# Patient Record
Sex: FEMALE | Race: BLACK OR AFRICAN AMERICAN | NOT HISPANIC OR LATINO
[De-identification: names, ages, dates, MRNs, and addresses within clinical notes are randomized per-mention and may not be internally consistent; named-entity substitution may affect disease eponyms.]

---

## 2017-04-28 PROBLEM — Z00.00 ENCOUNTER FOR PREVENTIVE HEALTH EXAMINATION: Status: ACTIVE | Noted: 2017-04-28

## 2017-05-04 ENCOUNTER — APPOINTMENT (OUTPATIENT)
Dept: ULTRASOUND IMAGING | Facility: CLINIC | Age: 37
End: 2017-05-04

## 2017-05-04 ENCOUNTER — APPOINTMENT (OUTPATIENT)
Dept: MAMMOGRAPHY | Facility: CLINIC | Age: 37
End: 2017-05-04

## 2018-04-07 ENCOUNTER — EMERGENCY (EMERGENCY)
Facility: HOSPITAL | Age: 38
LOS: 1 days | Discharge: HOME | End: 2018-04-07
Attending: EMERGENCY MEDICINE | Admitting: EMERGENCY MEDICINE
Payer: MEDICAID

## 2018-04-07 VITALS
RESPIRATION RATE: 20 BRPM | DIASTOLIC BLOOD PRESSURE: 98 MMHG | TEMPERATURE: 98 F | HEART RATE: 75 BPM | OXYGEN SATURATION: 100 % | SYSTOLIC BLOOD PRESSURE: 148 MMHG

## 2018-04-07 VITALS
HEART RATE: 74 BPM | TEMPERATURE: 98 F | DIASTOLIC BLOOD PRESSURE: 84 MMHG | SYSTOLIC BLOOD PRESSURE: 132 MMHG | OXYGEN SATURATION: 99 % | RESPIRATION RATE: 16 BRPM

## 2018-04-07 PROCEDURE — 99283 EMERGENCY DEPT VISIT LOW MDM: CPT | Mod: 25

## 2018-04-07 PROCEDURE — 99283 EMERGENCY DEPT VISIT LOW MDM: CPT

## 2018-04-07 RX ORDER — DIPHENHYDRAMINE HCL 50 MG
50 CAPSULE ORAL ONCE
Qty: 0 | Refills: 0 | Status: COMPLETED | OUTPATIENT
Start: 2018-04-07 | End: 2018-04-07

## 2018-04-07 RX ADMIN — Medication 50 MILLIGRAM(S): at 04:38

## 2018-04-07 NOTE — ED PROVIDER NOTE - CARE PLAN
Principal Discharge DX:	Allergic reaction  Assessment and plan of treatment:	You were seen in the Emergency Department for itching. Your examination and lab tests were reassuring.  You may have had an allergic reaction. Take benadryl as needed for itching. Stay aware from the possible allergens as discussed. Please return to the Emergency Department if you have any new concerning symptoms such as severe pain, shortness of breath or any other concerning symptoms.

## 2018-04-07 NOTE — ED PROVIDER NOTE - SKIN, MLM
Skin normal color for race, warm, dry and intact. No evidence of rash. mild erythema to b/l hands and chest

## 2018-04-07 NOTE — ED ADULT NURSE NOTE - CHPI ED SYMPTOMS NEG
no vomiting/no swelling of face, tongue/no throat itching/no nausea/no rash/no wheezing/no difficulty breathing

## 2018-04-07 NOTE — ED PROVIDER NOTE - CONSTITUTIONAL, MLM
normal... Well appearing, well nourished, awake, alert, oriented to person, place, time/situation and in no apparent distress. No, Declined

## 2018-04-07 NOTE — ED PROVIDER NOTE - OBJECTIVE STATEMENT
38yo female p/w generalized pruritis. Pt. reports pruritis in hands, face, chest. Pt. reports intermittent stomach cramps and 2 loose stools. Denies fevers, cough, shortness of breath, cp, vomiting. Pt. reports nausea as well. Did not take any medications. Pt. reports new toothpaste tonight. Pt. had nachos at movie theater last night.

## 2018-04-07 NOTE — ED PROVIDER NOTE - PLAN OF CARE
You were seen in the Emergency Department for itching. Your examination and lab tests were reassuring.  You may have had an allergic reaction. Take benadryl as needed for itching. Stay aware from the possible allergens as discussed. Please return to the Emergency Department if you have any new concerning symptoms such as severe pain, shortness of breath or any other concerning symptoms.

## 2018-04-07 NOTE — ED PROVIDER NOTE - ATTENDING CONTRIBUTION TO CARE
36yo female p/w generalized pruritis with unclear etiology noted, given benadryl with improvement sts.  no heent findings, lung cta b/l, vss, unclear casue with no prior allergic rxs.

## 2018-04-07 NOTE — ED ADULT NURSE NOTE - OBJECTIVE STATEMENT
37 year old female patient presents to ED c/o generalized itchiness to hands and chest that awoke her from sleep at about 345. Patient denies new detergents, soaps, foods or medications. States she went to the movies and had nachos, but did not 37 year old female patient presents to ED c/o generalized itchiness to hands and chest that awoke her from sleep at about 345. Patient denies new detergents, soaps, foods or medications. States she went to the movies and had nachos, but did not have any immediate allergic reaction. Patient denies SOB or wheezing, CP, abd pain, n/v/d, fever, chills. Patient states she felt nauseous upon waking up but it passed shortly after. Pt reports itchiness started in chest but has been improving since arrival. Did not take benadryl prior to arrival. No hives or redness noted to hands or chest.

## 2019-01-31 ENCOUNTER — RESULT REVIEW (OUTPATIENT)
Age: 39
End: 2019-01-31

## 2020-04-16 NOTE — ED ADULT TRIAGE NOTE - ESI TRIAGE ACUITY LEVEL, MLM
Scheduled patient for Monday, 4/20    She states if she is better, she will call and/or send mychart note. 4

## 2020-07-22 ENCOUNTER — EMERGENCY (EMERGENCY)
Facility: HOSPITAL | Age: 40
LOS: 1 days | Discharge: HOME | End: 2020-07-22
Attending: EMERGENCY MEDICINE | Admitting: EMERGENCY MEDICINE
Payer: MEDICAID

## 2020-07-22 VITALS
OXYGEN SATURATION: 100 % | SYSTOLIC BLOOD PRESSURE: 175 MMHG | TEMPERATURE: 98 F | RESPIRATION RATE: 26 BRPM | DIASTOLIC BLOOD PRESSURE: 103 MMHG | HEART RATE: 132 BPM

## 2020-07-22 PROCEDURE — 93010 ELECTROCARDIOGRAM REPORT: CPT

## 2020-07-22 PROCEDURE — 99291 CRITICAL CARE FIRST HOUR: CPT | Mod: 25

## 2020-07-22 NOTE — ED ADULT TRIAGE NOTE - CHIEF COMPLAINT QUOTE
pt here for shortness of breath and chest pain. pt appears anxious, hyperventilating, tachycardic to 130. no pmhx pt here for shortness of breath and chest pain x 3 days, acutely worse this evening. denies any fevers or cough. pt appears anxious, hyperventilating, and tachycardic to 130. no pmhx

## 2020-07-22 NOTE — ED ADULT NURSE NOTE - CHIEF COMPLAINT QUOTE
pt here for shortness of breath and chest pain x 3 days, acutely worse this evening. denies any fevers or cough. pt appears anxious, hyperventilating, and tachycardic to 130. no pmhx

## 2020-07-22 NOTE — ED ADULT NURSE NOTE - OBJECTIVE STATEMENT
Pt received to room 20 a/o x 3 c/o SOB, tingling in bilateral lower extremities  and left arm, and chest tightness intermittently for 2 week and progressively got worse today. Pt noted to be anxious and hyperventilating. pt states she has hx of anxiety attacks. pt states they usually got worse when its hot and humid. Pt noted to relax and appear less anxious through out the interview. Respirations even and unlabored. Lung sounds clear with equal chest rise bilaterally. ABD is soft, non tender, non distended with normal active bowel sounds No complaints of chest pain, headache, nausea, dizziness, vomiting  SOB, fever, chills verbalized. Pt on cardiac monitor in sinus tach.

## 2020-07-23 VITALS
HEART RATE: 97 BPM | OXYGEN SATURATION: 100 % | DIASTOLIC BLOOD PRESSURE: 87 MMHG | RESPIRATION RATE: 17 BRPM | TEMPERATURE: 99 F | SYSTOLIC BLOOD PRESSURE: 142 MMHG

## 2020-07-23 DIAGNOSIS — Z98.890 OTHER SPECIFIED POSTPROCEDURAL STATES: Chronic | ICD-10-CM

## 2020-07-23 LAB
ALBUMIN SERPL ELPH-MCNC: 4.6 G/DL — SIGNIFICANT CHANGE UP (ref 3.3–5)
ALP SERPL-CCNC: 72 U/L — SIGNIFICANT CHANGE UP (ref 40–120)
ALT FLD-CCNC: 10 U/L — SIGNIFICANT CHANGE UP (ref 4–33)
ANION GAP SERPL CALC-SCNC: 17 MMO/L — HIGH (ref 7–14)
ANISOCYTOSIS BLD QL: SIGNIFICANT CHANGE UP
APTT BLD: 26.1 SEC — LOW (ref 27–36.3)
AST SERPL-CCNC: 19 U/L — SIGNIFICANT CHANGE UP (ref 4–32)
BASOPHILS # BLD AUTO: 0.06 K/UL — SIGNIFICANT CHANGE UP (ref 0–0.2)
BASOPHILS NFR BLD AUTO: 0.6 % — SIGNIFICANT CHANGE UP (ref 0–2)
BASOPHILS NFR SPEC: 0.9 % — SIGNIFICANT CHANGE UP (ref 0–2)
BILIRUB SERPL-MCNC: 0.3 MG/DL — SIGNIFICANT CHANGE UP (ref 0.2–1.2)
BLASTS # FLD: 0 % — SIGNIFICANT CHANGE UP (ref 0–0)
BLD GP AB SCN SERPL QL: NEGATIVE — SIGNIFICANT CHANGE UP
BUN SERPL-MCNC: 7 MG/DL — SIGNIFICANT CHANGE UP (ref 7–23)
CALCIUM SERPL-MCNC: 9.3 MG/DL — SIGNIFICANT CHANGE UP (ref 8.4–10.5)
CHLORIDE SERPL-SCNC: 102 MMOL/L — SIGNIFICANT CHANGE UP (ref 98–107)
CO2 SERPL-SCNC: 22 MMOL/L — SIGNIFICANT CHANGE UP (ref 22–31)
CREAT SERPL-MCNC: 0.85 MG/DL — SIGNIFICANT CHANGE UP (ref 0.5–1.3)
D DIMER BLD IA.RAPID-MCNC: < 150 NG/ML — SIGNIFICANT CHANGE UP
ELLIPTOCYTES BLD QL SMEAR: SLIGHT — SIGNIFICANT CHANGE UP
EOSINOPHIL # BLD AUTO: 0.12 K/UL — SIGNIFICANT CHANGE UP (ref 0–0.5)
EOSINOPHIL NFR BLD AUTO: 1.2 % — SIGNIFICANT CHANGE UP (ref 0–6)
EOSINOPHIL NFR FLD: 0 % — SIGNIFICANT CHANGE UP (ref 0–6)
GIANT PLATELETS BLD QL SMEAR: PRESENT — SIGNIFICANT CHANGE UP
GLUCOSE SERPL-MCNC: 116 MG/DL — HIGH (ref 70–99)
HCG SERPL-ACNC: < 5 MIU/ML — SIGNIFICANT CHANGE UP
HCT VFR BLD CALC: 27.5 % — LOW (ref 34.5–45)
HGB BLD-MCNC: 8.1 G/DL — LOW (ref 11.5–15.5)
HYPOCHROMIA BLD QL: SLIGHT — SIGNIFICANT CHANGE UP
IMM GRANULOCYTES NFR BLD AUTO: 0.5 % — SIGNIFICANT CHANGE UP (ref 0–1.5)
INR BLD: 0.96 — SIGNIFICANT CHANGE UP (ref 0.88–1.17)
LYMPHOCYTES # BLD AUTO: 2.82 K/UL — SIGNIFICANT CHANGE UP (ref 1–3.3)
LYMPHOCYTES # BLD AUTO: 28.8 % — SIGNIFICANT CHANGE UP (ref 13–44)
LYMPHOCYTES NFR SPEC AUTO: 15.7 % — SIGNIFICANT CHANGE UP (ref 13–44)
MCHC RBC-ENTMCNC: 21 PG — LOW (ref 27–34)
MCHC RBC-ENTMCNC: 29.5 % — LOW (ref 32–36)
MCV RBC AUTO: 71.2 FL — LOW (ref 80–100)
METAMYELOCYTES # FLD: 0 % — SIGNIFICANT CHANGE UP (ref 0–1)
MICROCYTES BLD QL: SIGNIFICANT CHANGE UP
MONOCYTES # BLD AUTO: 0.7 K/UL — SIGNIFICANT CHANGE UP (ref 0–0.9)
MONOCYTES NFR BLD AUTO: 7.1 % — SIGNIFICANT CHANGE UP (ref 2–14)
MONOCYTES NFR BLD: 3.7 % — SIGNIFICANT CHANGE UP (ref 2–9)
MYELOCYTES NFR BLD: 0 % — SIGNIFICANT CHANGE UP (ref 0–0)
NEUTROPHIL AB SER-ACNC: 76.9 % — SIGNIFICANT CHANGE UP (ref 43–77)
NEUTROPHILS # BLD AUTO: 6.05 K/UL — SIGNIFICANT CHANGE UP (ref 1.8–7.4)
NEUTROPHILS NFR BLD AUTO: 61.8 % — SIGNIFICANT CHANGE UP (ref 43–77)
NEUTS BAND # BLD: 0 % — SIGNIFICANT CHANGE UP (ref 0–6)
NRBC # BLD: 1 /100WBC — SIGNIFICANT CHANGE UP
NRBC # FLD: 0 K/UL — SIGNIFICANT CHANGE UP (ref 0–0)
OTHER - HEMATOLOGY %: 0 — SIGNIFICANT CHANGE UP
OVALOCYTES BLD QL SMEAR: SLIGHT — SIGNIFICANT CHANGE UP
PLATELET # BLD AUTO: 423 K/UL — HIGH (ref 150–400)
PLATELET COUNT - ESTIMATE: NORMAL — SIGNIFICANT CHANGE UP
PMV BLD: 9.6 FL — SIGNIFICANT CHANGE UP (ref 7–13)
POIKILOCYTOSIS BLD QL AUTO: SLIGHT — SIGNIFICANT CHANGE UP
POLYCHROMASIA BLD QL SMEAR: SLIGHT — SIGNIFICANT CHANGE UP
POTASSIUM SERPL-MCNC: 3.6 MMOL/L — SIGNIFICANT CHANGE UP (ref 3.5–5.3)
POTASSIUM SERPL-SCNC: 3.6 MMOL/L — SIGNIFICANT CHANGE UP (ref 3.5–5.3)
PROMYELOCYTES # FLD: 0 % — SIGNIFICANT CHANGE UP (ref 0–0)
PROT SERPL-MCNC: 7 G/DL — SIGNIFICANT CHANGE UP (ref 6–8.3)
PROTHROM AB SERPL-ACNC: 10.9 SEC — SIGNIFICANT CHANGE UP (ref 9.8–13.1)
RBC # BLD: 3.86 M/UL — SIGNIFICANT CHANGE UP (ref 3.8–5.2)
RBC # FLD: 18.3 % — HIGH (ref 10.3–14.5)
RH IG SCN BLD-IMP: POSITIVE — SIGNIFICANT CHANGE UP
SARS-COV-2 RNA SPEC QL NAA+PROBE: SIGNIFICANT CHANGE UP
SCHISTOCYTES BLD QL AUTO: SLIGHT — SIGNIFICANT CHANGE UP
SMUDGE CELLS # BLD: PRESENT — SIGNIFICANT CHANGE UP
SODIUM SERPL-SCNC: 141 MMOL/L — SIGNIFICANT CHANGE UP (ref 135–145)
VARIANT LYMPHS # BLD: 2.8 % — SIGNIFICANT CHANGE UP
WBC # BLD: 9.8 K/UL — SIGNIFICANT CHANGE UP (ref 3.8–10.5)
WBC # FLD AUTO: 9.8 K/UL — SIGNIFICANT CHANGE UP (ref 3.8–10.5)

## 2020-07-23 PROCEDURE — 71046 X-RAY EXAM CHEST 2 VIEWS: CPT | Mod: 26

## 2020-07-23 RX ORDER — SODIUM CHLORIDE 9 MG/ML
1000 INJECTION INTRAMUSCULAR; INTRAVENOUS; SUBCUTANEOUS ONCE
Refills: 0 | Status: COMPLETED | OUTPATIENT
Start: 2020-07-23 | End: 2020-07-23

## 2020-07-23 RX ADMIN — SODIUM CHLORIDE 1000 MILLILITER(S): 9 INJECTION INTRAMUSCULAR; INTRAVENOUS; SUBCUTANEOUS at 02:05

## 2020-07-23 NOTE — ED PROVIDER NOTE - PATIENT PORTAL LINK FT
You can access the FollowMyHealth Patient Portal offered by [facility] by registering at the following website: [email or web address]. By joining [employer]’s FollowMyHealth portal, you will also be able to view your health information using other applications (apps) compatible with our system.

## 2020-07-23 NOTE — ED PROVIDER NOTE - NS ED ROS FT
CONST: no fevers, no chills  EYES: no vision changes  ENT: no sore throat  CV: + chest pain, +palpitations, no leg swelling  RESP: + shortness of breath, no cough  ABD: no abdominal pain, no nausea, no vomiting, no diarrhea  MSK: no back pain, no extremity pain  NEURO: no headache, +LUE numbness   HEME: no easy bleeding  SKIN:  no rash

## 2020-07-23 NOTE — ED PROVIDER NOTE - PHYSICAL EXAMINATION
Physical Exam:  Gen: awake, alert, appears anxious   HEENT: PEERL, normal conjunctiva, oral mucosa moist  Lung: CTAB, no respiratory distress, no wheezes/rhonchi/rales B/L, speaking in full sentences  CV: Rapid rate, RR, no murmurs, rubs or gallops  Abd: soft, NT, ND, no guarding, no rigidity  MSK: no visible deformities, ROM normal in UE/LE  Neuro: MS +5/5 in UE and LE BL, gross sensation intact in UE and LE BL  Skin: Warm, well perfused, no rash, no leg swelling  ~Rachel Chapa D.O. -Resident

## 2020-07-23 NOTE — ED PROVIDER NOTE - CLINICAL SUMMARY MEDICAL DECISION MAKING FREE TEXT BOX
39y F w/ PMHx iron deficiency anemia presenting to the ED w/ 2 weeks of progressively worsening keen and episodes of lightheadedness, palpitations and one episode of numbness to LUE. Tachycardic, HTN on arrival to ED, patient satting 100% on RA , non-tachypneic, appears anxious. Pt endorses hx of COVID in March, w/ new tachycardia, sob, chest tightness will obtain d-dimer to r/o PE, ACS w/u although doubt, possible symptomatic anemia. Labs, trop, dimer, coags, type, EKG, CXR, IVF, reassess.

## 2020-07-23 NOTE — ED PROVIDER NOTE - PROGRESS NOTE DETAILS
Eduard ROJAS (PGY-2): On reassessment, patient states she still "feels anxious" remains tachycardic to 110s, IVF hanging, discussed normal lab findings with patient, will continue to monitor. Pt labs are unremarkable. Anemia is mild at Hb 8. Previous 4 per pt requiring Iron tranfusion. Pt now improved with IVF, VS normalized without antihtn meds. Explained findings to pt. she is amenable to discharge to f/u with her previous scheduled visit with her hematologist tomorrow. Return precautions explained and understood.

## 2020-07-23 NOTE — ED PROVIDER NOTE - OBJECTIVE STATEMENT
39y F w/ PMHx iron deficiency anemia presenting to the ED w/ 2 weeks of progressively worsening lopez and episodes of lightheadedness, palpitations and one episode of numbness to LUE. Patient states she is presenting to ED today because this evening she had the "worst episode of dizziness, w/ chest tightness, LUE numbness and sob". She states she has been under "a lot more stress with the passing of my father". Patient states her chest tightness is exacerbated by her LOPEZ. Gets iron transfusions regularly, states her last transfusion was 5 months although she usually gets them more regularly but was unable to 2/2 COVID. Has hx of Hgb of 4, states at the time she did not get blood transfusion, received iron transfusion. Has appointment with Hematology tomorrow for iron transfusion. Endorses heavy periods but denies recent hx of bloody stools. Patient states she has never had similar symptoms with her hx of anemia. Denies recent fever, chills, cough, n/v, abd pain.

## 2020-07-23 NOTE — ED PROVIDER NOTE - ATTENDING CONTRIBUTION TO CARE
HPI: 39y F w/ PMHx iron deficiency anemia presenting to the ED w/ 2 weeks of progressively worsening lopez and episodes of lightheadedness, palpitations and one episode of numbness to LUE without syncope. Patient states she is presenting to ED today because this evening she had the "worst episode of dizziness, w/ chest tightness, LUE numbness and sob". She states she has been under "a lot more stress with the passing of my father". Patient states her chest tightness is exacerbated by her LOPEZ. Gets iron transfusions regularly, states her last transfusion was 5 months although she usually gets them more regularly but was unable to 2/2 COVID. Has hx of Hgb of 4, states at the time she did not get blood transfusion, received iron transfusion. Has appointment with Hematology tomorrow for iron transfusion. Endorses heavy periods but denies recent hx of bloody stools. Patient states she has never had similar symptoms with her hx of anemia. Denies recent fever, chills, cough, n/v, abd pain. LMP currently. pt reports 8ppd and was rx'd BCPs in past but didn't want side effects.   EXAM: Uncomfortable, Tachy and HTN on VS, writhing, not able to sit still, heart tachy without M/R/G/JVD, lungs ctab, abd soft but distended, no pitting edema BLE, uunable to assess cap refill secondary to painted nails, warm ext with equal and palpable pulses x 4. Speaking half sentences.   MDM: pt with iron def anemia that presents with 2 wks worsening SOB and LOPEZ and lightheaded also palpitation. Pt with heavy menstrual periods (8ppd) that pt multiple symptoms associated with most likely anemia with heavy menstraul periods. Pt tachy and HTN, possible stress and anxiety induced. Also consider PE. Will need work up and IVF and meds and reassess.

## 2020-07-23 NOTE — ED PROVIDER NOTE - CARE PLAN
Principal Discharge DX:	Hiatal hernia Principal Discharge DX:	Shortness of breath  Secondary Diagnosis:	Atypical chest pain

## 2020-07-23 NOTE — ED PROVIDER NOTE - NSFOLLOWUPINSTRUCTIONS_ED_ALL_ED_FT
- Lab and imaging results, if performed, were discussed with you along with your discharge diagnosis    -Please call to schedule a follow-up appointment with your primary doctor later today, bring all of your lab results with you    - Return to the ED for any new, worsening, or concerning symptoms to you including worsening chest pain, shortness of breath of dizziness    - Follow-up with your Hematologist tomorrow for your scheduled iron transfusion     - Rest and keep yourself hydrated with fluids

## 2020-07-23 NOTE — ED PROVIDER NOTE - PMH
No pertinent past medical history <<----- Click to add NO pertinent Past Medical History Iron deficiency anemia

## 2020-10-20 ENCOUNTER — TRANSCRIPTION ENCOUNTER (OUTPATIENT)
Age: 40
End: 2020-10-20

## 2020-10-20 ENCOUNTER — INPATIENT (INPATIENT)
Facility: HOSPITAL | Age: 40
LOS: 0 days | Discharge: HOME | DRG: 310 | End: 2020-10-20
Attending: INTERNAL MEDICINE | Admitting: INTERNAL MEDICINE
Payer: MEDICAID

## 2020-10-20 VITALS
TEMPERATURE: 99 F | RESPIRATION RATE: 18 BRPM | HEART RATE: 87 BPM | OXYGEN SATURATION: 100 % | DIASTOLIC BLOOD PRESSURE: 84 MMHG | SYSTOLIC BLOOD PRESSURE: 122 MMHG

## 2020-10-20 VITALS
RESPIRATION RATE: 18 BRPM | DIASTOLIC BLOOD PRESSURE: 88 MMHG | OXYGEN SATURATION: 100 % | WEIGHT: 175.05 LBS | HEIGHT: 63 IN | HEART RATE: 105 BPM | SYSTOLIC BLOOD PRESSURE: 138 MMHG | TEMPERATURE: 98 F

## 2020-10-20 DIAGNOSIS — D50.9 IRON DEFICIENCY ANEMIA, UNSPECIFIED: ICD-10-CM

## 2020-10-20 DIAGNOSIS — R00.0 TACHYCARDIA, UNSPECIFIED: ICD-10-CM

## 2020-10-20 DIAGNOSIS — I44.1 ATRIOVENTRICULAR BLOCK, SECOND DEGREE: ICD-10-CM

## 2020-10-20 DIAGNOSIS — Z86.19 PERSONAL HISTORY OF OTHER INFECTIOUS AND PARASITIC DISEASES: ICD-10-CM

## 2020-10-20 LAB
ALBUMIN SERPL ELPH-MCNC: 4.7 G/DL — SIGNIFICANT CHANGE UP (ref 3.3–5)
ALP SERPL-CCNC: 70 U/L — SIGNIFICANT CHANGE UP (ref 40–120)
ALT FLD-CCNC: 20 U/L — SIGNIFICANT CHANGE UP (ref 10–45)
ANION GAP SERPL CALC-SCNC: 12 MMOL/L — SIGNIFICANT CHANGE UP (ref 5–17)
APPEARANCE UR: CLEAR — SIGNIFICANT CHANGE UP
AST SERPL-CCNC: 26 U/L — SIGNIFICANT CHANGE UP (ref 10–40)
BASOPHILS # BLD AUTO: 0.06 K/UL — SIGNIFICANT CHANGE UP (ref 0–0.2)
BASOPHILS NFR BLD AUTO: 0.8 % — SIGNIFICANT CHANGE UP (ref 0–2)
BILIRUB SERPL-MCNC: 0.3 MG/DL — SIGNIFICANT CHANGE UP (ref 0.2–1.2)
BILIRUB UR-MCNC: NEGATIVE — SIGNIFICANT CHANGE UP
BUN SERPL-MCNC: 9 MG/DL — SIGNIFICANT CHANGE UP (ref 7–23)
CALCIUM SERPL-MCNC: 9.6 MG/DL — SIGNIFICANT CHANGE UP (ref 8.4–10.5)
CHLORIDE SERPL-SCNC: 98 MMOL/L — SIGNIFICANT CHANGE UP (ref 96–108)
CO2 SERPL-SCNC: 25 MMOL/L — SIGNIFICANT CHANGE UP (ref 22–31)
COLOR SPEC: COLORLESS — SIGNIFICANT CHANGE UP
CREAT SERPL-MCNC: 0.8 MG/DL — SIGNIFICANT CHANGE UP (ref 0.5–1.3)
D DIMER BLD IA.RAPID-MCNC: <150 NG/ML DDU — SIGNIFICANT CHANGE UP
DIFF PNL FLD: NEGATIVE — SIGNIFICANT CHANGE UP
EOSINOPHIL # BLD AUTO: 0.21 K/UL — SIGNIFICANT CHANGE UP (ref 0–0.5)
EOSINOPHIL NFR BLD AUTO: 2.7 % — SIGNIFICANT CHANGE UP (ref 0–6)
GLUCOSE SERPL-MCNC: 117 MG/DL — HIGH (ref 70–99)
GLUCOSE UR QL: NEGATIVE — SIGNIFICANT CHANGE UP
HCG SERPL-ACNC: <2 MIU/ML — SIGNIFICANT CHANGE UP
HCT VFR BLD CALC: 33.3 % — LOW (ref 34.5–45)
HGB BLD-MCNC: 10.4 G/DL — LOW (ref 11.5–15.5)
IMM GRANULOCYTES NFR BLD AUTO: 0.3 % — SIGNIFICANT CHANGE UP (ref 0–1.5)
KETONES UR-MCNC: NEGATIVE — SIGNIFICANT CHANGE UP
LEUKOCYTE ESTERASE UR-ACNC: NEGATIVE — SIGNIFICANT CHANGE UP
LYMPHOCYTES # BLD AUTO: 3.81 K/UL — HIGH (ref 1–3.3)
LYMPHOCYTES # BLD AUTO: 49.2 % — HIGH (ref 13–44)
MCHC RBC-ENTMCNC: 25.3 PG — LOW (ref 27–34)
MCHC RBC-ENTMCNC: 31.2 GM/DL — LOW (ref 32–36)
MCV RBC AUTO: 81 FL — SIGNIFICANT CHANGE UP (ref 80–100)
MONOCYTES # BLD AUTO: 0.56 K/UL — SIGNIFICANT CHANGE UP (ref 0–0.9)
MONOCYTES NFR BLD AUTO: 7.2 % — SIGNIFICANT CHANGE UP (ref 2–14)
NEUTROPHILS # BLD AUTO: 3.08 K/UL — SIGNIFICANT CHANGE UP (ref 1.8–7.4)
NEUTROPHILS NFR BLD AUTO: 39.8 % — LOW (ref 43–77)
NITRITE UR-MCNC: NEGATIVE — SIGNIFICANT CHANGE UP
NRBC # BLD: 0 /100 WBCS — SIGNIFICANT CHANGE UP (ref 0–0)
NT-PROBNP SERPL-SCNC: 22 PG/ML — SIGNIFICANT CHANGE UP (ref 0–300)
PH UR: 6.5 — SIGNIFICANT CHANGE UP (ref 5–8)
PLATELET # BLD AUTO: 364 K/UL — SIGNIFICANT CHANGE UP (ref 150–400)
POTASSIUM SERPL-MCNC: 3.2 MMOL/L — LOW (ref 3.5–5.3)
POTASSIUM SERPL-SCNC: 3.2 MMOL/L — LOW (ref 3.5–5.3)
PROT SERPL-MCNC: 7.2 G/DL — SIGNIFICANT CHANGE UP (ref 6–8.3)
PROT UR-MCNC: NEGATIVE — SIGNIFICANT CHANGE UP
RBC # BLD: 4.11 M/UL — SIGNIFICANT CHANGE UP (ref 3.8–5.2)
RBC # FLD: 18.2 % — HIGH (ref 10.3–14.5)
SARS-COV-2 IGG SERPL QL IA: POSITIVE
SARS-COV-2 IGM SERPL IA-ACNC: 2.46 INDEX — HIGH
SARS-COV-2 RNA SPEC QL NAA+PROBE: SIGNIFICANT CHANGE UP
SODIUM SERPL-SCNC: 135 MMOL/L — SIGNIFICANT CHANGE UP (ref 135–145)
SP GR SPEC: 1 — LOW (ref 1.01–1.02)
TROPONIN T, HIGH SENSITIVITY RESULT: <6 NG/L — SIGNIFICANT CHANGE UP (ref 0–51)
UROBILINOGEN FLD QL: NEGATIVE — SIGNIFICANT CHANGE UP
WBC # BLD: 7.74 K/UL — SIGNIFICANT CHANGE UP (ref 3.8–10.5)
WBC # FLD AUTO: 7.74 K/UL — SIGNIFICANT CHANGE UP (ref 3.8–10.5)

## 2020-10-20 PROCEDURE — 93010 ELECTROCARDIOGRAM REPORT: CPT

## 2020-10-20 PROCEDURE — 99223 1ST HOSP IP/OBS HIGH 75: CPT | Mod: GC

## 2020-10-20 PROCEDURE — 93306 TTE W/DOPPLER COMPLETE: CPT | Mod: 26

## 2020-10-20 PROCEDURE — 99285 EMERGENCY DEPT VISIT HI MDM: CPT

## 2020-10-20 PROCEDURE — 71045 X-RAY EXAM CHEST 1 VIEW: CPT | Mod: 26

## 2020-10-20 RX ORDER — INFLUENZA VIRUS VACCINE 15; 15; 15; 15 UG/.5ML; UG/.5ML; UG/.5ML; UG/.5ML
0.5 SUSPENSION INTRAMUSCULAR ONCE
Refills: 0 | Status: DISCONTINUED | OUTPATIENT
Start: 2020-10-20 | End: 2020-10-20

## 2020-10-20 RX ORDER — SODIUM CHLORIDE 9 MG/ML
1000 INJECTION INTRAMUSCULAR; INTRAVENOUS; SUBCUTANEOUS ONCE
Refills: 0 | Status: COMPLETED | OUTPATIENT
Start: 2020-10-20 | End: 2020-10-20

## 2020-10-20 RX ORDER — POTASSIUM CHLORIDE 20 MEQ
40 PACKET (EA) ORAL ONCE
Refills: 0 | Status: COMPLETED | OUTPATIENT
Start: 2020-10-20 | End: 2020-10-20

## 2020-10-20 RX ADMIN — SODIUM CHLORIDE 1000 MILLILITER(S): 9 INJECTION INTRAMUSCULAR; INTRAVENOUS; SUBCUTANEOUS at 02:33

## 2020-10-20 RX ADMIN — SODIUM CHLORIDE 1000 MILLILITER(S): 9 INJECTION INTRAMUSCULAR; INTRAVENOUS; SUBCUTANEOUS at 03:58

## 2020-10-20 RX ADMIN — Medication 40 MILLIEQUIVALENT(S): at 02:34

## 2020-10-20 NOTE — ED ADULT TRIAGE NOTE - CHIEF COMPLAINT QUOTE
Patient c/o palpitations and SOB symptoms started slowly on Sunday, but Monday got worse at about 2300 pm.

## 2020-10-20 NOTE — ED ADULT NURSE NOTE - NSIMPLEMENTINTERV_GEN_ALL_ED
Implemented All Universal Safety Interventions:  [place] to call system. Call bell, personal items and telephone within reach. Instruct patient to call for assistance. Room bathroom lighting operational. Non-slip footwear when patient is off stretcher. Physically safe environment: no spills, clutter or unnecessary equipment. Stretcher in lowest position, wheels locked, appropriate side rails in place.

## 2020-10-20 NOTE — ED PROVIDER NOTE - ATTENDING CONTRIBUTION TO CARE
39F, pmh anemia 2/2 heavy menses, no transfusions in past, presents with palpitations x two days. Accompanied by intermittent sensation of lightheadedness and "discomfort" in chest. Denies chest pain, sob, abd pain. Denies hx PE or DVT. Denies calf pain or swelling. Denies n/v/d, cough, congestion. Of note did present to ED for similar sx in July, found to be sinus tach, d/c'd with outpatient f/u.    PE: NAD, NCAT, MMM, Trachea midline, Normal conjunctiva, lungs CTAB, S1/S2 irregular, Normal perfusion, 2+ radial pulses bilat, Abdomen Soft, NTND, No rebound/guarding, No LE edema, No deformity of extremities, No rashes,  No focal motor or sensory deficits.     EKG reveals Mobitz I block. No acute ischemic changes noted. Consider anemia, metabolic disturbance, lower suspicion of ischemia. Unlikely PE, however persistently tachycardic, will check d-dimer. Without features suggestive of aortic pathology. Cardiac monitor. Give IVF. Re-eval. - Hugh Evans MD

## 2020-10-20 NOTE — CONSULT NOTE ADULT - ATTENDING COMMENTS
39 year old woman presents with palpitations, had similar presentation to [facility] ER last month and improved with fluids. Currently observe sinus tachycardia with Wenckebach in 3:2 pattern. Overnight symptoms have subsided and rhythm on telemetry is sinus. Observe she had COVID-19 in March. Denies any activities which would likely expose her to Lyme disease. Need cardiac echo doppler and subsequently consider merits of cardiac MRI while continuing to observe on telemetry.

## 2020-10-20 NOTE — ED PROVIDER NOTE - NS ED ROS FT
Constitution: No Fever or chills, No Weight Loss,   HEENT: No cough, No Discharge, No Rhinorrhea, No URI symptoms  Cardio: No Chest pain, (+) Palpitations, No Dyspnea  Resp: No SOB, No Wheezing  GI: No abdominal pain, No Nausea, No Vomiting, No Constipation, No Diarrhea  : No burning upon urination, trouble urinating, no foul odor from urine  MSK: No Back pain, No Numbness, No Tingling, No Weakness  Neuro: No Headache, No changes to Vision, No changes to Hearing, Normal Gait  Skin: No rashes, No Bruising, No Swelling

## 2020-10-20 NOTE — DISCHARGE NOTE NURSING/CASE MANAGEMENT/SOCIAL WORK - PATIENT PORTAL LINK FT
You can access the FollowMyHealth Patient Portal offered by [facility] by registering at the following website: [email or web address]. By joining [employer]’s FollowMyHealth portal, you will also be able to view your health information using other applications (apps) compatible with our system.

## 2020-10-20 NOTE — H&P ADULT - NSHPLABSRESULTS_GEN_ALL_CORE
Vital Signs Last 24 Hrs  T(C): 37 (20 Oct 2020 06:00), Max: 37 (20 Oct 2020 06:00)  T(F): 98.6 (20 Oct 2020 06:00), Max: 98.6 (20 Oct 2020 06:00)  HR: 100 (20 Oct 2020 06:00) (95 - 110)  BP: 126/79 (20 Oct 2020 06:00) (126/79 - 138/88)  BP(mean): --  RR: 18 (20 Oct 2020 06:00) (18 - 20)  SpO2: 100% (20 Oct 2020 06:00) (100% - 100%)      Labs:                          10.4   7.74  )-----------( 364      ( 20 Oct 2020 01:43 )             33.3       10-20    135  |  98  |  9   ----------------------------<  117<H>  3.2<L>   |  25  |  0.80    Ca    9.6      20 Oct 2020 01:43  Mg     1.9     10-20    TPro  7.2  /  Alb  4.7  /  TBili  0.3  /  DBili  x   /  AST  26  /  ALT  20  /  AlkPhos  70  10-20        Urinalysis Basic - ( 20 Oct 2020 02:16 )    Color: Colorless / Appearance: Clear / S.003 / pH: x  Gluc: x / Ketone: Negative  / Bili: Negative / Urobili: Negative   Blood: x / Protein: Negative / Nitrite: Negative   Leuk Esterase: Negative / RBC: x / WBC x   Sq Epi: x / Non Sq Epi: x / Bacteria: x    D-Dimer Assay, Quantitative (10.20.20 @ 02:57)   D-Dimer Assay, Quantitative: <150 ng/mL DDU     Serum Pro-Brain Natriuretic Peptide (10.20.20 @ 01:43)   Serum Pro-Brain Natriuretic Peptide: 22 pg/mL

## 2020-10-20 NOTE — CONSULT NOTE ADULT - ASSESSMENT
39 F with prior history of COVID in march, anemia presents with palpitations for three days with dizziness. She denies chest pain, cardiac markers negative, pro BNP negative, electrolytes WNL. Given the unknown sequela of post COVID tachycardia and cardiomyopathy it is reasonable to evaluation her cardiac function and monitor her rhythm, especially with the variable AV blocks    #Palpitations:  -Check TSH, TTE  -monitor on tele for 24 hours. If work up is negative patient may follow up outpatient with cardiology and per PCP    Xuan Robbins MD  Cardiology Fellow

## 2020-10-20 NOTE — ED PROVIDER NOTE - OBJECTIVE STATEMENT
39 female, Hx: Fe Def Anemia - presents with palpitations for the last 2 days. Pt reports she endorses additional "chest discomfort" when the palpitations come on; denies any chest pain, SOB, dizziness, nausea, vomiting, diaphoresis, numbness/tingling/weakness in peripheral extremities. Denies any prior history of PE/DVT, denies any calf tenderness/swelling/erythema.

## 2020-10-20 NOTE — DISCHARGE NOTE PROVIDER - NSDCCPCAREPLAN_GEN_ALL_CORE_FT
PRINCIPAL DISCHARGE DIAGNOSIS  Diagnosis: Mobitz (type) I (Wenckebach's) atrioventricular block  Assessment and Plan of Treatment: Follow up with Dr. Hardy in 1 week.      SECONDARY DISCHARGE DIAGNOSES  Diagnosis: Tachycardia  Assessment and Plan of Treatment: You may have symptoms such as dizziness, chest pain, or fainting (syncope) that are caused by your fast heart rate.  Take your medication as prescribed.  If episodes of supraventricular tachycardia (SVT) start suddenly and cause symptoms, you can try vagal maneuvers—such as holding your breath and bearing down (Valsalva maneuver), or coughing. These simple maneuvers stimulate the vagus nerve, which can slow conduction of electrical impulses that control your heart rate. Your doctor can teach you how to do vagal maneuvers safely.  Notify your doctor or go to the nearest emergency room if your heart rate doesn't slow and symptoms persist.

## 2020-10-20 NOTE — CONSULT NOTE ADULT - SUBJECTIVE AND OBJECTIVE BOX
Xuan Robbins MD  Cardiology Fellow  [phone]  All Cardiology service information can be found 24/7 on [email or web address], password: [email or web address]    Patient seen and evaluated at bedside    Chief Complaint: palpitations    HPI:  39 F with PMH of anemia due to menorrhagia, COVID in March, presents with palpitations while she was trying to sleep. She began working out earlier today and was able to without any difficulty. She went to bed however she felt her heart racing and was unable. She has been feeling this for the past three days. This has happened one time before 2 months ago. She went to [facility] and was given fluids with improvement of her palpitations and was discharged home. She says she got a "flutter" pain on her left breast three weeks ago but no other chest pain. No SOB, has unlimited ET, no other chest pain, PND, orthopnea or LE edema.     PMHx:   Iron deficiency anemia    No pertinent past medical history        PSHx:   No significant past surgical history    History of hernia repair        Allergies:  No Known Allergies          FAMILY HISTORY: history of MIs and DM in multiple family members      denies any toxic habits    REVIEW OF SYSTEMS:  CONSTITUTIONAL: No weakness, fevers or chills  EYES/ENT: No visual changes;  No dysphagia  NECK: No pain or stiffness  RESPIRATORY: No cough, wheezing, hemoptysis; No shortness of breath  CARDIOVASCULAR: See above  GASTROINTESTINAL: No abdominal or epigastric pain. No nausea, vomiting, or hematemesis; No diarrhea or constipation.  GENITOURINARY: No dysuria, frequency or hematuria  NEUROLOGICAL: No numbness or weakness  SKIN: No itching, burning, rashes, or lesions   All other review of systems is negative unless indicated above.    Physical Exam:  T(F): 97.8 (10-20), Max: 97.8 (10-20)  HR: 104 (10-20) (95 - 110)  BP: 128/89 (10-20) (128/89 - 138/88)  RR: 18 (10-20)  SpO2: 100% (10-20)  GENERAL: No acute distress, well-developed  HEAD:  Atraumatic, Normocephalic  ENT: EOMI, PERRLA, conjunctiva and sclera clear, Neck supple, No JVD, moist mucosa  CHEST/LUNG: Clear to auscultation bilaterally; No wheeze, equal breath sounds bilaterally   BACK: No spinal tenderness  HEART: tachycardic regular rate and rhythm; No murmurs, rubs, or gallops  ABDOMEN: Soft, Nontender, Nondistended; Bowel sounds present  EXTREMITIES:  No clubbing, cyanosis, or edema    Cardiovascular Diagnostic Testing:    ECG: Personally reviewed: Holley type 1 HB at 76 and then 1st degree Hb at 101 on repeat      Labs: Personally reviewed                        10.4   7.74  )-----------( 364      ( 20 Oct 2020 01:43 )             33.3     10-20    135  |  98  |  9   ----------------------------<  117<H>  3.2<L>   |  25  |  0.80    Ca    9.6      20 Oct 2020 01:43  Mg     1.9     10-20    TPro  7.2  /  Alb  4.7  /  TBili  0.3  /  DBili  x   /  AST  26  /  ALT  20  /  AlkPhos  70  10-20        CARDIAC MARKERS ( 20 Oct 2020 01:43 )  <6 ng/L / x     / x     / x     / x     / x          Serum Pro-Brain Natriuretic Peptide: 22 pg/mL (10-20 @ 01:43)

## 2020-10-20 NOTE — ED PROVIDER NOTE - PROGRESS NOTE DETAILS
Pt with persistent lightheadedness/palpitations and continued AV block with episodes of tachycardia to low 100s. Cardiology consulted, will appreciate their recs. - Hugh Evans MD Resident [place]: spoke with pt's PMD (Mikael Hardy -[phone]) - advised him of Cardiology recommendations for TTE inpatient, 2/2 to concern for POSTCovid -related Cardiomyopathy. Persistently Tachycardia s/p 2L IVF. Anemia is baseline. Orthostatics neg. D-Dimer < 150.

## 2020-10-20 NOTE — DISCHARGE NOTE PROVIDER - HOSPITAL COURSE
Patient presented with intermittent palpitations over the past few days has been getting progressively worse patient had palpitations in the past and went to the ER was given IV fluids and got better.  No lightheadedness, no dizziness, no chest pain or shortness of breath.  Patient was diagnosed with coated 19 earlier in the year symptoms gradually resolved.  Patient was seen by cardiology in the emergency room who wanted patient to have an echocardiogram prior to discharge.  Initial cardiac workup in the ER was negative with respect to cardiac enzymes and d-dimer.  Patient was unable to be kept in observation unit as there were no beds so patient was admitted for further management and evaluation.  Since in the hospital.  Patient has not had significant arrhythmias but EKG was found to have first-degree AV block and Mobitz type I AV block on EKG per cardiology.   Problem: Mobitz (type) I (Wenckebach's) atrioventricular block.  Plan: Stable rhythm.  No further evaluation or treatment needed.      Problem/Plan - 2:  ·  Problem: Tachycardia.  Plan: Suspect due to anemiaHeart rate is now well controlled.      Problem/Plan - 3:  ·  Problem: Iron deficiency anemia.  Plan: Followup with hematologist for iron infusions.      Problem/Plan - 4:  ·  Problem: History of 2019 novel coronavirus disease (COVID-19).  Plan: echocardiogram to evaluate for COVID-19 related cardiomyopathy. WNL  D/C to home.

## 2020-10-20 NOTE — H&P ADULT - HISTORY OF PRESENT ILLNESS
Patient presented with intermittent palpitations over the past few days has been getting progressively worse patient had palpitations in the past and went to the ER was given IV fluids and got better.  No lightheadedness, no dizziness, no chest pain or shortness of breath.  Patient was diagnosed with coated 19 earlier in the year symptoms gradually resolved.  Patient was seen by cardiology in the emergency room who wanted patient to have an echocardiogram prior to discharge.  Initial cardiac workup in the ER was negative with respect to cardiac enzymes and d-dimer.  Patient was unable to be kept in observation unit as there were no beds so patient was admitted for further management and evaluation.  Since in the hospital.  Patient has not had significant arrhythmias but EKG was found to have first-degree AV block and Mobitz type I AV block on EKG per cardiology.  However, EKGs are not available in the chart and not available in sunrise at that time that I saw the patient

## 2020-10-20 NOTE — DISCHARGE NOTE PROVIDER - CARE PROVIDER_API CALL
Lex Hardy  INTERNAL MEDICINE  [address], [address]  [place] [address]  Phone: [phone]  Fax: [phone]  Follow Up Time:

## 2020-10-20 NOTE — ED PROVIDER NOTE - PHYSICAL EXAMINATION
GEN - NAD; well appearing; A+Ox3   HEAD - NC/AT, No visible Ecchymosis, No Abrasions, No Lacerations/Skin Tears     EYES - EOMI, no conjunctival pallor, no scleral icterus  ENT -   mucous membranes  moist , no discharge      PULM - CTA B/L,  symmetric breath sounds  COR -  Tachy, S1 S2, no murmurs  ABD - NT/ND, soft, no guarding, no rebound, no masses    BACK - no CVA tenderness  EXTREMS - 2+ Pulses B/L UE and LE; 0+ edema, no gross deformity, warm and well perfused, no calf tenderness/swelling/erythema

## 2020-10-20 NOTE — ED PROVIDER NOTE - CLINICAL SUMMARY MEDICAL DECISION MAKING FREE TEXT BOX
39 female, Hx: Fe Def Anemia - presents with palpitations for the last 2 days. Exam, presentation, and history concerning for palpitations - likely in the setting of anemia vs. orthostatics vs. dysrhythmia - minimal concern for ACS (minimal risk factors, no CP), Dissection, Structural heart Disease. Plan: CBC, CMP, D-Dimer, Trop, CXR, EKG (new onset Mobitz 1, No BETH or Depressions).

## 2020-10-20 NOTE — ED PROVIDER NOTE - CARE PLAN
Principal Discharge DX:	Mobitz (type) I (Wenckebach's) atrioventricular block  Secondary Diagnosis:	Tachycardia

## 2020-10-20 NOTE — ED ADULT NURSE NOTE - OBJECTIVE STATEMENT
Pt is a 39y Female c/o palpitations. Pt PMHx Iron deficiency anemia. Pt states that for the past 3 days she has been having palpitations. Pt states she had a similar episode to this 2 moths ago and was seen at [facility] and d/c to follow up with PCP. Pt has not seen a cardiologist. Pt states she has been having worsening palpitations this evening. Pt is tachycardic and has Mobitz 1 Heart block. Pt is resting in bed anxious appearing. Pt prefers to go by Shannon and uses She/Her/Hers pronouns. Pt resting comfortably in bed with RN and MD at bedside. Pt educated on call bell use and call bell placed at bedside. Pt safety maintained.

## 2020-10-21 LAB
CULTURE RESULTS: SIGNIFICANT CHANGE UP
SPECIMEN SOURCE: SIGNIFICANT CHANGE UP

## 2020-10-22 ENCOUNTER — RESULT REVIEW (OUTPATIENT)
Age: 40
End: 2020-10-22

## 2020-10-29 PROCEDURE — 80053 COMPREHEN METABOLIC PANEL: CPT

## 2020-10-29 PROCEDURE — 84702 CHORIONIC GONADOTROPIN TEST: CPT

## 2020-10-29 PROCEDURE — 83880 ASSAY OF NATRIURETIC PEPTIDE: CPT

## 2020-10-29 PROCEDURE — 84484 ASSAY OF TROPONIN QUANT: CPT

## 2020-10-29 PROCEDURE — 93005 ELECTROCARDIOGRAM TRACING: CPT

## 2020-10-29 PROCEDURE — 81003 URINALYSIS AUTO W/O SCOPE: CPT

## 2020-10-29 PROCEDURE — 85379 FIBRIN DEGRADATION QUANT: CPT

## 2020-10-29 PROCEDURE — 85025 COMPLETE CBC W/AUTO DIFF WBC: CPT

## 2020-10-29 PROCEDURE — 83735 ASSAY OF MAGNESIUM: CPT

## 2020-10-29 PROCEDURE — 87086 URINE CULTURE/COLONY COUNT: CPT

## 2020-10-29 PROCEDURE — 99284 EMERGENCY DEPT VISIT MOD MDM: CPT | Mod: 25

## 2020-10-29 PROCEDURE — 71045 X-RAY EXAM CHEST 1 VIEW: CPT

## 2020-10-29 PROCEDURE — C8929: CPT

## 2020-10-29 PROCEDURE — U0003: CPT

## 2020-10-29 PROCEDURE — 86769 SARS-COV-2 COVID-19 ANTIBODY: CPT

## 2020-12-08 ENCOUNTER — EMERGENCY (EMERGENCY)
Facility: HOSPITAL | Age: 40
LOS: 1 days | Discharge: HOME | End: 2020-12-08
Attending: EMERGENCY MEDICINE
Payer: MEDICAID

## 2020-12-08 VITALS
HEART RATE: 115 BPM | WEIGHT: 149.91 LBS | SYSTOLIC BLOOD PRESSURE: 166 MMHG | HEIGHT: 62 IN | TEMPERATURE: 98 F | OXYGEN SATURATION: 100 % | RESPIRATION RATE: 18 BRPM | DIASTOLIC BLOOD PRESSURE: 105 MMHG

## 2020-12-08 LAB
ALBUMIN SERPL ELPH-MCNC: 4.6 G/DL — SIGNIFICANT CHANGE UP (ref 3.3–5)
ALP SERPL-CCNC: 76 U/L — SIGNIFICANT CHANGE UP (ref 40–120)
ALT FLD-CCNC: 17 U/L — SIGNIFICANT CHANGE UP (ref 10–45)
ANION GAP SERPL CALC-SCNC: 13 MMOL/L — SIGNIFICANT CHANGE UP (ref 5–17)
AST SERPL-CCNC: 24 U/L — SIGNIFICANT CHANGE UP (ref 10–40)
BASOPHILS # BLD AUTO: 0.07 K/UL — SIGNIFICANT CHANGE UP (ref 0–0.2)
BASOPHILS NFR BLD AUTO: 0.9 % — SIGNIFICANT CHANGE UP (ref 0–2)
BILIRUB SERPL-MCNC: 0.3 MG/DL — SIGNIFICANT CHANGE UP (ref 0.2–1.2)
BUN SERPL-MCNC: 6 MG/DL — LOW (ref 7–23)
CALCIUM SERPL-MCNC: 9.9 MG/DL — SIGNIFICANT CHANGE UP (ref 8.4–10.5)
CHLORIDE SERPL-SCNC: 103 MMOL/L — SIGNIFICANT CHANGE UP (ref 96–108)
CO2 SERPL-SCNC: 25 MMOL/L — SIGNIFICANT CHANGE UP (ref 22–31)
CREAT SERPL-MCNC: 0.91 MG/DL — SIGNIFICANT CHANGE UP (ref 0.5–1.3)
EOSINOPHIL # BLD AUTO: 0.05 K/UL — SIGNIFICANT CHANGE UP (ref 0–0.5)
EOSINOPHIL NFR BLD AUTO: 0.6 % — SIGNIFICANT CHANGE UP (ref 0–6)
GAS PNL BLDV: SIGNIFICANT CHANGE UP
GLUCOSE SERPL-MCNC: 111 MG/DL — HIGH (ref 70–99)
HCT VFR BLD CALC: 31.1 % — LOW (ref 34.5–45)
HGB BLD-MCNC: 9.3 G/DL — LOW (ref 11.5–15.5)
IMM GRANULOCYTES NFR BLD AUTO: 0.6 % — SIGNIFICANT CHANGE UP (ref 0–1.5)
LYMPHOCYTES # BLD AUTO: 2.04 K/UL — SIGNIFICANT CHANGE UP (ref 1–3.3)
LYMPHOCYTES # BLD AUTO: 26.5 % — SIGNIFICANT CHANGE UP (ref 13–44)
MAGNESIUM SERPL-MCNC: 2 MG/DL — SIGNIFICANT CHANGE UP (ref 1.6–2.6)
MCHC RBC-ENTMCNC: 23.3 PG — LOW (ref 27–34)
MCHC RBC-ENTMCNC: 29.9 GM/DL — LOW (ref 32–36)
MCV RBC AUTO: 77.8 FL — LOW (ref 80–100)
MONOCYTES # BLD AUTO: 0.47 K/UL — SIGNIFICANT CHANGE UP (ref 0–0.9)
MONOCYTES NFR BLD AUTO: 6.1 % — SIGNIFICANT CHANGE UP (ref 2–14)
NEUTROPHILS # BLD AUTO: 5.03 K/UL — SIGNIFICANT CHANGE UP (ref 1.8–7.4)
NEUTROPHILS NFR BLD AUTO: 65.3 % — SIGNIFICANT CHANGE UP (ref 43–77)
NRBC # BLD: 0 /100 WBCS — SIGNIFICANT CHANGE UP (ref 0–0)
PLATELET # BLD AUTO: 381 K/UL — SIGNIFICANT CHANGE UP (ref 150–400)
POTASSIUM SERPL-MCNC: 3.5 MMOL/L — SIGNIFICANT CHANGE UP (ref 3.5–5.3)
POTASSIUM SERPL-SCNC: 3.5 MMOL/L — SIGNIFICANT CHANGE UP (ref 3.5–5.3)
PROT SERPL-MCNC: 7.5 G/DL — SIGNIFICANT CHANGE UP (ref 6–8.3)
RBC # BLD: 4 M/UL — SIGNIFICANT CHANGE UP (ref 3.8–5.2)
RBC # FLD: 14.7 % — HIGH (ref 10.3–14.5)
SODIUM SERPL-SCNC: 141 MMOL/L — SIGNIFICANT CHANGE UP (ref 135–145)
TROPONIN T, HIGH SENSITIVITY RESULT: <6 NG/L — SIGNIFICANT CHANGE UP (ref 0–51)
WBC # BLD: 7.71 K/UL — SIGNIFICANT CHANGE UP (ref 3.8–10.5)
WBC # FLD AUTO: 7.71 K/UL — SIGNIFICANT CHANGE UP (ref 3.8–10.5)

## 2020-12-08 PROCEDURE — 71046 X-RAY EXAM CHEST 2 VIEWS: CPT | Mod: 26

## 2020-12-08 PROCEDURE — 99285 EMERGENCY DEPT VISIT HI MDM: CPT

## 2020-12-08 RX ORDER — IRON SUCROSE 20 MG/ML
200 INJECTION, SOLUTION INTRAVENOUS ONCE
Refills: 0 | Status: COMPLETED | OUTPATIENT
Start: 2020-12-08 | End: 2020-12-08

## 2020-12-08 RX ORDER — SODIUM CHLORIDE 9 MG/ML
1000 INJECTION INTRAMUSCULAR; INTRAVENOUS; SUBCUTANEOUS ONCE
Refills: 0 | Status: COMPLETED | OUTPATIENT
Start: 2020-12-08 | End: 2020-12-08

## 2020-12-08 RX ADMIN — SODIUM CHLORIDE 1000 MILLILITER(S): 9 INJECTION INTRAMUSCULAR; INTRAVENOUS; SUBCUTANEOUS at 23:00

## 2020-12-08 NOTE — ED PROVIDER NOTE - OBJECTIVE STATEMENT
40y female with PMH of iron deficiency anemia presenting with palpitations. Started this morning, similar to when she had similar symptoms in october. Palpitations are constant. No associated SOB, exertional SOB, leg swelling, chest pain, nausea, vomiting, diaphoresis. Had pain between the shoulder blades yesterday, now resolved. Had normal echo in october after admission for similar symptoms. Menstrual cycle started on 12/4.

## 2020-12-08 NOTE — ED PROVIDER NOTE - PHYSICAL EXAMINATION
Gen: AAOx3, non-toxic  Head: NCAT  HEENT: oral mucosa moist, normal conjunctiva  Lung: CTAB, no respiratory distress, speaking in full sentences  CV: RRR, no murmurs, rubs or gallops, no LE swelling  Abd: soft, NTND, no guarding  MSK: no visible deformities  Neuro: No focal sensory or motor deficits  Skin: Warm, well perfused, no rash  Psych: normal affect.   ~Juan Yariel PGY3

## 2020-12-08 NOTE — ED ADULT NURSE NOTE - OBJECTIVE STATEMENT
Patient is a 40y female presenting to the ED ambulatory from home with c/o palpitations. Patient A&Ox4. Patient is a 40y female presenting to the ED ambulatory from home with c/o palpitations. Patient A&Ox4. Patient reports developing palpitations this morning and noticing a rapid heart rate on her "Apple Watch" with heart rate going up to 130 bpm. Denies any chest pain or SOB associated with the rapid heart rate. Denies dizziness, Patient is a 40y female presenting to the ED ambulatory from home with c/o palpitations. Patient A&Ox4. Patient reports developing palpitations this morning and noticing a rapid heart rate on her "Apple Watch" with heart rate going up to 130 bpm. Denies any chest pain or SOB associated with the rapid heart rate. Denies dizziness, fever/chills, nausea, vomiting, diarrhea, abdominal pain. Denies any recent sick contacts or recent travel. Patient is a 40y female presenting to the ED ambulatory from home with c/o palpitations. Patient A&Ox4. Patient reports developing palpitations this morning and noticing a rapid heart rate on her "Apple Watch" with heart rate going up to 130 bpm. Upon arrival to [facility] ED, patient's heart rate ranging between 120-136 bpm. ECG showed sinus tachycardia, patient placed on cardiac monitor. Lung sounds clear bilaterally. Patient reports having a similar episode in October of this year, was treated at [facility]. Denies any chest pain or SOB associated with the rapid heart rate. Denies dizziness, fever/chills, nausea, vomiting, diarrhea, abdominal pain. Denies any recent sick contacts or recent travel.

## 2020-12-08 NOTE — ED PROVIDER NOTE - NS ED ROS FT
Gen: No fever, No chills  Eyes: No vision changes  ENT: No congestion, sore throat  Resp: No cough. No SOB  Cardiovascular: No chest pain. +palpitations  GI: No abdominal pain, nausea/vomiting, diarrhea, constipation  :  No change in urine output or frequency; no dysuria  MS: No joint. No muscle pain  Skin: No rashes  Neuro: No headache; No numbness or weakness  Remainder negative, except as per the HPI

## 2020-12-09 VITALS
SYSTOLIC BLOOD PRESSURE: 154 MMHG | DIASTOLIC BLOOD PRESSURE: 91 MMHG | OXYGEN SATURATION: 97 % | TEMPERATURE: 98 F | HEART RATE: 92 BPM | RESPIRATION RATE: 18 BRPM

## 2020-12-09 LAB
A1C WITH ESTIMATED AVERAGE GLUCOSE RESULT: 5.8 % — HIGH (ref 4–5.6)
APPEARANCE UR: CLEAR — SIGNIFICANT CHANGE UP
BILIRUB UR-MCNC: NEGATIVE — SIGNIFICANT CHANGE UP
CHOLEST SERPL-MCNC: 151 MG/DL — SIGNIFICANT CHANGE UP
COLOR SPEC: COLORLESS — SIGNIFICANT CHANGE UP
DIFF PNL FLD: ABNORMAL
ESTIMATED AVERAGE GLUCOSE: 120 MG/DL — HIGH (ref 68–114)
GLUCOSE UR QL: NEGATIVE — SIGNIFICANT CHANGE UP
HDLC SERPL-MCNC: 62 MG/DL — SIGNIFICANT CHANGE UP
KETONES UR-MCNC: NEGATIVE — SIGNIFICANT CHANGE UP
LEUKOCYTE ESTERASE UR-ACNC: NEGATIVE — SIGNIFICANT CHANGE UP
LIPID PNL WITH DIRECT LDL SERPL: 73 MG/DL — SIGNIFICANT CHANGE UP
NITRITE UR-MCNC: NEGATIVE — SIGNIFICANT CHANGE UP
NON HDL CHOLESTEROL: 89 MG/DL — SIGNIFICANT CHANGE UP
PH UR: 7 — SIGNIFICANT CHANGE UP (ref 5–8)
PROT UR-MCNC: NEGATIVE — SIGNIFICANT CHANGE UP
SARS-COV-2 RNA SPEC QL NAA+PROBE: SIGNIFICANT CHANGE UP
SP GR SPEC: 1 — LOW (ref 1.01–1.02)
TRIGL SERPL-MCNC: 80 MG/DL — SIGNIFICANT CHANGE UP
TSH SERPL-MCNC: 1.84 UIU/ML — SIGNIFICANT CHANGE UP (ref 0.27–4.2)
UROBILINOGEN FLD QL: NEGATIVE — SIGNIFICANT CHANGE UP

## 2020-12-09 PROCEDURE — 84484 ASSAY OF TROPONIN QUANT: CPT

## 2020-12-09 PROCEDURE — 82803 BLOOD GASES ANY COMBINATION: CPT

## 2020-12-09 PROCEDURE — 93306 TTE W/DOPPLER COMPLETE: CPT

## 2020-12-09 PROCEDURE — 93306 TTE W/DOPPLER COMPLETE: CPT | Mod: 26,59

## 2020-12-09 PROCEDURE — 83036 HEMOGLOBIN GLYCOSYLATED A1C: CPT

## 2020-12-09 PROCEDURE — 84295 ASSAY OF SERUM SODIUM: CPT

## 2020-12-09 PROCEDURE — 93018 CV STRESS TEST I&R ONLY: CPT

## 2020-12-09 PROCEDURE — 85018 HEMOGLOBIN: CPT

## 2020-12-09 PROCEDURE — 80053 COMPREHEN METABOLIC PANEL: CPT

## 2020-12-09 PROCEDURE — 83735 ASSAY OF MAGNESIUM: CPT

## 2020-12-09 PROCEDURE — G0378: CPT

## 2020-12-09 PROCEDURE — 84132 ASSAY OF SERUM POTASSIUM: CPT

## 2020-12-09 PROCEDURE — 99236 HOSP IP/OBS SAME DATE HI 85: CPT

## 2020-12-09 PROCEDURE — 82435 ASSAY OF BLOOD CHLORIDE: CPT

## 2020-12-09 PROCEDURE — 93320 DOPPLER ECHO COMPLETE: CPT

## 2020-12-09 PROCEDURE — 96374 THER/PROPH/DIAG INJ IV PUSH: CPT

## 2020-12-09 PROCEDURE — 84443 ASSAY THYROID STIM HORMONE: CPT

## 2020-12-09 PROCEDURE — 93351 STRESS TTE COMPLETE: CPT

## 2020-12-09 PROCEDURE — 81001 URINALYSIS AUTO W/SCOPE: CPT

## 2020-12-09 PROCEDURE — 80061 LIPID PANEL: CPT

## 2020-12-09 PROCEDURE — 83605 ASSAY OF LACTIC ACID: CPT

## 2020-12-09 PROCEDURE — 99285 EMERGENCY DEPT VISIT HI MDM: CPT | Mod: 25

## 2020-12-09 PROCEDURE — 82330 ASSAY OF CALCIUM: CPT

## 2020-12-09 PROCEDURE — U0003: CPT

## 2020-12-09 PROCEDURE — 93005 ELECTROCARDIOGRAM TRACING: CPT

## 2020-12-09 PROCEDURE — 93017 CV STRESS TEST TRACING ONLY: CPT

## 2020-12-09 PROCEDURE — 85014 HEMATOCRIT: CPT

## 2020-12-09 PROCEDURE — 93325 DOPPLER ECHO COLOR FLOW MAPG: CPT

## 2020-12-09 PROCEDURE — 82947 ASSAY GLUCOSE BLOOD QUANT: CPT

## 2020-12-09 PROCEDURE — 71046 X-RAY EXAM CHEST 2 VIEWS: CPT

## 2020-12-09 PROCEDURE — 93016 CV STRESS TEST SUPVJ ONLY: CPT

## 2020-12-09 PROCEDURE — 85025 COMPLETE CBC W/AUTO DIFF WBC: CPT

## 2020-12-09 PROCEDURE — 93350 STRESS TTE ONLY: CPT | Mod: 26

## 2020-12-09 RX ADMIN — IRON SUCROSE 110 MILLIGRAM(S): 20 INJECTION, SOLUTION INTRAVENOUS at 00:00

## 2020-12-09 NOTE — ED CDU PROVIDER INITIAL DAY NOTE - FAMILY HISTORY
Father  Still living? Unknown  Family history of heart valve abnormality, Age at diagnosis: Age Unknown

## 2020-12-09 NOTE — ED CDU PROVIDER DISPOSITION NOTE - CLINICAL COURSE
40y female with PMH of iron deficiency anemia presenting with palpitations. Started this morning, similar to when she had similar symptoms in october. Palpitations are constant. No associated SOB, exertional SOB, leg swelling, chest pain, nausea, vomiting, diaphoresis. Had pain between the shoulder blades yesterday, now resolved. Had normal echo in october after admission for similar symptoms. Menstrual cycle started on 12/4.  In ED pts HR lowered to low 100s HD stable, labs unrevealing with H/H near baseline. CXR without acute findings. pt received iron transfusion (due for outpt schedule). Went to CDU for tele, stress, echo concern for persist tachycardia of unknown origin.  In CDU, 40y female with PMH of iron deficiency anemia presenting with palpitations. Started this morning, similar to when she had similar symptoms in october. Palpitations are constant. No associated SOB, exertional SOB, leg swelling, chest pain, nausea, vomiting, diaphoresis. Had pain between the shoulder blades yesterday, now resolved. Had normal echo in october after admission for similar symptoms. Menstrual cycle started on 12/4.  In ED pts HR lowered to low 100s HD stable, labs unrevealing with H/H near baseline. CXR without acute findings. pt received iron transfusion (due for outpt schedule). Went to CDU for tele, stress, echo concern for persist tachycardia of unknown origin.  In CDU, patient's symptoms improved.  no events on tele. TTE and stress echo normal.  Patient to follow up with her PMD and cardiology upon discharge.  Strict return precautions provided.

## 2020-12-09 NOTE — ED CDU PROVIDER DISPOSITION NOTE - PATIENT PORTAL LINK FT
You can access the FollowMyHealth Patient Portal offered by [facility] by registering at the following website: [email or web address]. By joining [employer]’s FollowMyHealth portal, you will also be able to view your health information using other applications (apps) compatible with our system.

## 2020-12-09 NOTE — ED CDU PROVIDER INITIAL DAY NOTE - OBJECTIVE STATEMENT
40y female with PMH of iron deficiency anemia presenting with palpitations. Started this morning, similar to when she had similar symptoms in october. Palpitations are constant. No associated SOB, exertional SOB, leg swelling, chest pain, nausea, vomiting, diaphoresis. Had pain between the shoulder blades yesterday, now resolved. Had normal echo in october after admission for similar symptoms. Menstrual cycle started on 12/4.  In ED pts HR lowered to low 100s, labs unrevealing with H/H near baseline. CXR without acute findings. pt received iron transfusion (due for outpt schedule). Went to CDU for tele, stress, echo concern for persist tachycardia of unknown origin. 40y female with PMH of iron deficiency anemia presenting with palpitations. Started this morning, similar to when she had similar symptoms in october. Palpitations are constant. No associated SOB, exertional SOB, leg swelling, chest pain, nausea, vomiting, diaphoresis. Had pain between the shoulder blades yesterday, now resolved. Had normal echo in october after admission for similar symptoms. Menstrual cycle started on 12/4.  In ED pts HR lowered to low 100s HD stable, labs unrevealing with H/H near baseline. CXR without acute findings. pt received iron transfusion (due for outpt schedule). Went to CDU for tele, stress, echo concern for persist tachycardia of unknown origin.

## 2020-12-09 NOTE — ED CDU PROVIDER INITIAL DAY NOTE - PROGRESS NOTE DETAILS
CDU PROGRESS NOTE TERI [place]: pt resting comfortably without complaint. NAD. VSS. denies CP/palpitations. no events on tele, NSR. pending stress and echo. Will continue to monitor. Patient seen at bedside in NAD.  VSS.  Patient resting comfortably without complaints. Palpitations have resolved.  TTE performed, awaiting results.  Awaiting stress.  -Miguel Lynch PA-C Patient seen at bedside in NAD.  VSS.  Patient resting comfortably without complaints. TTE unremarkable. Awaiting stress.  -Miguel Lynch PA-C Stress test equivocal with 0.5mm inferior downsloping st segments.  Contacted stress lab who will expedite stress echo for further evaluation.  -Miguel Lynch PA-C Patient seen at bedside in NAD.  VSS.  Patient resting comfortably without complaints. Stress echo normal.  Will DC home with PMD and cardiology follow up.  Strict return precautions provided.  -Miguel Lynch PA-C pt without complaints.  false + stress test --> negative stress echo.  pt stable for d/c and will fu with PMD and cardiology.

## 2020-12-09 NOTE — ED CDU PROVIDER INITIAL DAY NOTE - PHYSICAL EXAMINATION
Gen: AAOx3, non-toxic  Head: NCAT  HEENT: oral mucosa moist, normal conjunctiva  Lung: CTAB, no respiratory distress, speaking in full sentences  CV: RRR, no murmurs, rubs or gallops, no LE swelling  Abd: soft, NTND, no guarding  MSK: no visible deformities  Neuro: No focal sensory or motor deficits  Skin: Warm, well perfused, no rash  Psych: normal affect.

## 2020-12-09 NOTE — ED ADULT NURSE REASSESSMENT NOTE - NS ED NURSE REASSESS COMMENT FT1
Patient resting comfortably in stretcher. No complaints at this time. Patient to go to CDU for testing in the morning. Patient verbalized understanding of plan of care. Patient walked to restroom. Safety and comfort measures maintained.
Pt received from CLARISSA Flowers. Pt oriented to CDU & plan of care was discussed. Pt denies any chest pain, SOB, dizziness or palpitations. Pt states heart fluttering has stopped and she is feeling better than when she first arrived. Safety & comfort measures maintained. Call bell in reach. Will continue to monitor.

## 2020-12-09 NOTE — ED CDU PROVIDER DISPOSITION NOTE - CARE PROVIDER_API CALL
Ivette Washington (MD)  Cardiovascular Disease; Interventional Cardiology  [address] [place] [address]  Phone: [phone]  Fax: [phone]  Follow Up Time:

## 2020-12-09 NOTE — ED CDU PROVIDER INITIAL DAY NOTE - MEDICAL DECISION MAKING DETAILS
Dr. Hernandez (Attending Physician)  Christian Walker, iron deficiency anemia with palpitations, chest/upper back pain. very low suspicion for dissection or PE. Persistently tachycardic. Also with recent vaginal bleeding but no longer bleeding. Unable to get iron infusion after last visit. Will obs for iron infusion, stress test. Had echo in October and negative d-dimer when she had similar symptoms.

## 2020-12-09 NOTE — ED CDU PROVIDER DISPOSITION NOTE - NSFOLLOWUPINSTRUCTIONS_ED_ALL_ED_FT
Follow up with your PMD and/or cardiologist within 48-72 hours.   Show copies of your reports given to you.    Take all of your other medications as previously prescribed.     Return to the ED for worsening chest pain, shortness of breath, loss of consciousness, or any other concerns. 1.  Stay Hydrated  2.  Continue taking all current home medications  3.  Please follow up with your Primary care provider in 1-2 (Please bring all of your results with you)       Follow up with cardiology upon discharge  4.  Return to the ER for worsening Chest Pain, Shortness of breath or any other concerning symptoms.

## 2021-01-27 ENCOUNTER — APPOINTMENT (OUTPATIENT)
Dept: CARDIOLOGY | Facility: CLINIC | Age: 41
End: 2021-01-27
Payer: MEDICAID

## 2021-01-27 ENCOUNTER — APPOINTMENT (OUTPATIENT)
Dept: ELECTROPHYSIOLOGY | Facility: CLINIC | Age: 41
End: 2021-01-27
Payer: MEDICAID

## 2021-01-27 VITALS
OXYGEN SATURATION: 98 % | DIASTOLIC BLOOD PRESSURE: 87 MMHG | BODY MASS INDEX: 32.76 KG/M2 | SYSTOLIC BLOOD PRESSURE: 133 MMHG | WEIGHT: 178 LBS | HEIGHT: 62 IN | HEART RATE: 93 BPM

## 2021-01-27 DIAGNOSIS — I44.0 ATRIOVENTRICULAR BLOCK, FIRST DEGREE: ICD-10-CM

## 2021-01-27 DIAGNOSIS — U07.1 COVID-19: ICD-10-CM

## 2021-01-27 PROCEDURE — 99072 ADDL SUPL MATRL&STAF TM PHE: CPT

## 2021-01-27 PROCEDURE — 93000 ELECTROCARDIOGRAM COMPLETE: CPT

## 2021-01-27 PROCEDURE — 99203 OFFICE O/P NEW LOW 30 MIN: CPT

## 2021-02-03 PROCEDURE — 93228 REMOTE 30 DAY ECG REV/REPORT: CPT

## 2021-02-03 PROCEDURE — 99072 ADDL SUPL MATRL&STAF TM PHE: CPT

## 2021-02-16 ENCOUNTER — NON-APPOINTMENT (OUTPATIENT)
Age: 41
End: 2021-02-16

## 2021-02-16 PROBLEM — U07.1 COVID-19 VIRUS DETECTED: Status: ACTIVE | Noted: 2021-01-27

## 2021-02-16 NOTE — HISTORY OF PRESENT ILLNESS
[FreeTextEntry1] : Shannon is here for the eval of palps\par She notes a recent COVID infection and since has experienced palpitations described rapid heart beats\par This is accompanied by occassional dizziness.\par No syncope or blackouts\par No CP\par No LE edema\par

## 2021-02-16 NOTE — PHYSICAL EXAM
[General Appearance - Well Developed] : well developed [Normal Appearance] : normal appearance [Well Groomed] : well groomed [General Appearance - Well Nourished] : well nourished [No Deformities] : no deformities [General Appearance - In No Acute Distress] : no acute distress [Normal Conjunctiva] : the conjunctiva exhibited no abnormalities [Eyelids - No Xanthelasma] : the eyelids demonstrated no xanthelasmas [Normal Oral Mucosa] : normal oral mucosa [No Oral Pallor] : no oral pallor [No Oral Cyanosis] : no oral cyanosis [Normal Jugular Venous A Waves Present] : normal jugular venous A waves present [Normal Jugular Venous V Waves Present] : normal jugular venous V waves present [No Jugular Venous Roa A Waves] : no jugular venous roa A waves [Heart Rate And Rhythm] : heart rate and rhythm were normal [Heart Sounds] : normal S1 and S2 [Murmurs] : no murmurs present [Respiration, Rhythm And Depth] : normal respiratory rhythm and effort [Exaggerated Use Of Accessory Muscles For Inspiration] : no accessory muscle use [Auscultation Breath Sounds / Voice Sounds] : lungs were clear to auscultation bilaterally [Abdomen Soft] : soft [Abdomen Tenderness] : non-tender [Abdomen Mass (___ Cm)] : no abdominal mass palpated [Abnormal Walk] : normal gait [Gait - Sufficient For Exercise Testing] : the gait was sufficient for exercise testing [Nail Clubbing] : no clubbing of the fingernails [Cyanosis, Localized] : no localized cyanosis [Petechial Hemorrhages (___cm)] : no petechial hemorrhages [Skin Color & Pigmentation] : normal skin color and pigmentation [] : no rash [No Venous Stasis] : no venous stasis [No Skin Ulcers] : no skin ulcer [Skin Lesions] : no skin lesions [No Xanthoma] : no  xanthoma was observed [Oriented To Time, Place, And Person] : oriented to person, place, and time [Affect] : the affect was normal [Mood] : the mood was normal [No Anxiety] : not feeling anxious

## 2021-02-16 NOTE — REASON FOR VISIT
[Initial Evaluation] : an initial evaluation of [Dizziness] : dizziness [Palpitations] : palpitations

## 2021-04-07 ENCOUNTER — APPOINTMENT (OUTPATIENT)
Dept: DISASTER EMERGENCY | Facility: OTHER | Age: 41
End: 2021-04-07
Payer: MEDICAID

## 2021-04-07 PROCEDURE — 0001A: CPT

## 2021-04-28 ENCOUNTER — APPOINTMENT (OUTPATIENT)
Dept: DISASTER EMERGENCY | Facility: OTHER | Age: 41
End: 2021-04-28

## 2021-05-05 ENCOUNTER — APPOINTMENT (OUTPATIENT)
Dept: DISASTER EMERGENCY | Facility: OTHER | Age: 41
End: 2021-05-05
Payer: MEDICAID

## 2021-05-05 PROCEDURE — 0002A: CPT

## 2022-02-20 NOTE — ED PROVIDER NOTE - CRITICAL CARE PROVIDED
No
interpretation of diagnostic studies/consultation with other physicians/direct patient care (not related to procedure)/additional history taking/documentation

## 2022-06-03 NOTE — ED ADULT TRIAGE NOTE - NS ED TRIAGE AVPU SCALE
Alert-The patient is alert, awake and responds to voice. The patient is oriented to time, place, and person. The triage nurse is able to obtain subjective information.
FAMILY HISTORY:  Mother  Still living? Unknown  Family history of diabetes mellitus, Age at diagnosis: Age Unknown

## 2022-06-29 ENCOUNTER — APPOINTMENT (OUTPATIENT)
Dept: PULMONOLOGY | Facility: CLINIC | Age: 42
End: 2022-06-29

## 2022-08-08 ENCOUNTER — APPOINTMENT (OUTPATIENT)
Dept: ORTHOPEDIC SURGERY | Facility: CLINIC | Age: 42
End: 2022-08-08

## 2022-08-08 VITALS — HEIGHT: 61 IN | WEIGHT: 180 LBS | BODY MASS INDEX: 33.99 KG/M2

## 2022-08-08 DIAGNOSIS — M41.125 ADOLESCENT IDIOPATHIC SCOLIOSIS, THORACOLUMBAR REGION: ICD-10-CM

## 2022-08-08 DIAGNOSIS — M54.9 DORSALGIA, UNSPECIFIED: ICD-10-CM

## 2022-08-08 DIAGNOSIS — K21.00 GASTRO-ESOPHAGEAL REFLUX DISEASE WITH ESOPHAGITIS, WITHOUT BLEEDING: ICD-10-CM

## 2022-08-08 PROCEDURE — 72070 X-RAY EXAM THORAC SPINE 2VWS: CPT

## 2022-08-08 PROCEDURE — 99204 OFFICE O/P NEW MOD 45 MIN: CPT

## 2022-08-09 PROBLEM — K21.00 CHRONIC REFLUX ESOPHAGITIS: Status: RESOLVED | Noted: 2022-08-09 | Resolved: 2022-08-09

## 2022-08-09 NOTE — PHYSICAL EXAM
[de-identified] : She is fully alert and oriented with a normal mood and affect.  She is in no acute distress as a take the history.  She ambulates with a normal gait including tiptoe and heel walking.  There are no cutaneous abnormalities or palpable bony defects of the spine.  There is no evidence of shortness of breath or respiratory distress.  There is a mild shoulder height discrepancy and a small left flank crease.  With forward flexion of the spine she has a right-sided paravertebral prominence secondary to a mild scoliosis.  There is no paravertebral muscle spasm, sciatic notch tenderness or trochanteric tenderness.  Her lower extremity neurological examination revealed 1-2+ symmetrical reflexes.  Motor power is normal to manual testing and sensation is normal to light touch in all dermatomes.  Toes are downgoing.  Straight leg raising is negative to 90 degrees in the sitting position bilaterally.  Her hips and her knees have a full and painless range of motion with normal stability.  Vascular examination shows no evidence of varicosities and there is no lymphedema.  There are no cutaneous abnormalities of the upper extremities or of the lower extremities. [de-identified] : I reviewed 3 prior chest x-rays that reveal a mild appearing right thoracolumbar scoliosis.\par \par In light of her complaints AP and lateral x-rays of the spine standing centered in the lower thoracic spine were obtained.  She has a right thoracolumbar scoliosis of approximately 25 degrees.  Sagittal alignment is normal and there are no destructive changes.  There are minimal degenerative changes.

## 2022-08-09 NOTE — DISCUSSION/SUMMARY
[de-identified] : She will rest and use moist heat.  She has been involved with some higher intensity exercise 4 times a week that may stress her back.  She will back off slightly on the intensity of those activities.  She has been started on ibuprofen 800 mg 3 times a day as a nonsteroidal anti-inflammatory as well as omeprazole 20 mg once a day in light of her reflux associated cough.  She will call if there are problems with the medication or worsening of her symptoms and I will see her for follow-up in 4 weeks.

## 2022-08-09 NOTE — HISTORY OF PRESENT ILLNESS
[de-identified] : This 41-year-old woman is referred by Dr. Lex Hardy for evaluation of spine related symptoms.  2 years ago she started with some left-sided mid back pain that can radiate to the left lateral ribs.  There is no history of injury.  She has not had upper or lower back pain.  She has not had associated neurologic symptoms of numbness, paresthesias or weakness.  There is no leg pain.  There is no Valsalva or affect.  She gets occasional night pain more than once a week.  There have been no changes in her gait or balance.  Treatment to date has been massage and occasional aspirin or Advil as well as heat and ice.  She grades the pain is a 7.\par \par Her past medical history and review of systems are negative with the exception of a cough likely secondary to some reflux issues.

## 2022-09-21 ENCOUNTER — APPOINTMENT (OUTPATIENT)
Dept: OBGYN | Facility: CLINIC | Age: 42
End: 2022-09-21

## 2022-09-21 VITALS — RESPIRATION RATE: 75 BRPM | SYSTOLIC BLOOD PRESSURE: 129 MMHG | DIASTOLIC BLOOD PRESSURE: 84 MMHG | HEIGHT: 61 IN

## 2022-09-21 PROCEDURE — 99203 OFFICE O/P NEW LOW 30 MIN: CPT

## 2022-09-22 ENCOUNTER — RX RENEWAL (OUTPATIENT)
Age: 42
End: 2022-09-22

## 2022-09-23 ENCOUNTER — EMERGENCY (EMERGENCY)
Facility: HOSPITAL | Age: 42
LOS: 1 days | Discharge: HOME | End: 2022-09-23
Attending: STUDENT IN AN ORGANIZED HEALTH CARE EDUCATION/TRAINING PROGRAM
Payer: COMMERCIAL

## 2022-09-23 VITALS
DIASTOLIC BLOOD PRESSURE: 84 MMHG | RESPIRATION RATE: 20 BRPM | SYSTOLIC BLOOD PRESSURE: 134 MMHG | TEMPERATURE: 99 F | OXYGEN SATURATION: 100 % | HEART RATE: 88 BPM

## 2022-09-23 VITALS
HEIGHT: 62 IN | OXYGEN SATURATION: 100 % | SYSTOLIC BLOOD PRESSURE: 146 MMHG | DIASTOLIC BLOOD PRESSURE: 82 MMHG | WEIGHT: 175.05 LBS | RESPIRATION RATE: 18 BRPM | TEMPERATURE: 97 F | HEART RATE: 114 BPM

## 2022-09-23 LAB
ALBUMIN SERPL ELPH-MCNC: 5 G/DL — SIGNIFICANT CHANGE UP (ref 3.3–5)
ALP SERPL-CCNC: 54 U/L — SIGNIFICANT CHANGE UP (ref 40–120)
ALT FLD-CCNC: 12 U/L — SIGNIFICANT CHANGE UP (ref 10–45)
ANION GAP SERPL CALC-SCNC: 12 MMOL/L — SIGNIFICANT CHANGE UP (ref 5–17)
AST SERPL-CCNC: 16 U/L — SIGNIFICANT CHANGE UP (ref 10–40)
BASOPHILS # BLD AUTO: 0 K/UL — SIGNIFICANT CHANGE UP (ref 0–0.2)
BASOPHILS NFR BLD AUTO: 0 % — SIGNIFICANT CHANGE UP (ref 0–2)
BILIRUB SERPL-MCNC: 0.6 MG/DL — SIGNIFICANT CHANGE UP (ref 0.2–1.2)
BLD GP AB SCN SERPL QL: NEGATIVE — SIGNIFICANT CHANGE UP
BUN SERPL-MCNC: 5 MG/DL — LOW (ref 7–23)
CALCIUM SERPL-MCNC: 10.2 MG/DL — SIGNIFICANT CHANGE UP (ref 8.4–10.5)
CHLORIDE SERPL-SCNC: 104 MMOL/L — SIGNIFICANT CHANGE UP (ref 96–108)
CO2 SERPL-SCNC: 24 MMOL/L — SIGNIFICANT CHANGE UP (ref 22–31)
CREAT SERPL-MCNC: 0.66 MG/DL — SIGNIFICANT CHANGE UP (ref 0.5–1.3)
EGFR: 112 ML/MIN/1.73M2 — SIGNIFICANT CHANGE UP
EOSINOPHIL # BLD AUTO: 0 K/UL — SIGNIFICANT CHANGE UP (ref 0–0.5)
EOSINOPHIL NFR BLD AUTO: 0 % — SIGNIFICANT CHANGE UP (ref 0–6)
GLUCOSE SERPL-MCNC: 96 MG/DL — SIGNIFICANT CHANGE UP (ref 70–99)
HCG SERPL-ACNC: <2 MIU/ML — SIGNIFICANT CHANGE UP
HCT VFR BLD CALC: 31.2 % — LOW (ref 34.5–45)
HGB BLD-MCNC: 9.1 G/DL — LOW (ref 11.5–15.5)
LYMPHOCYTES # BLD AUTO: 1.19 K/UL — SIGNIFICANT CHANGE UP (ref 1–3.3)
LYMPHOCYTES # BLD AUTO: 16.5 % — SIGNIFICANT CHANGE UP (ref 13–44)
MAGNESIUM SERPL-MCNC: 2.1 MG/DL — SIGNIFICANT CHANGE UP (ref 1.6–2.6)
MCHC RBC-ENTMCNC: 19.7 PG — LOW (ref 27–34)
MCHC RBC-ENTMCNC: 29.2 GM/DL — LOW (ref 32–36)
MCV RBC AUTO: 67.7 FL — LOW (ref 80–100)
MONOCYTES # BLD AUTO: 0.56 K/UL — SIGNIFICANT CHANGE UP (ref 0–0.9)
MONOCYTES NFR BLD AUTO: 7.8 % — SIGNIFICANT CHANGE UP (ref 2–14)
NEUTROPHILS # BLD AUTO: 5.39 K/UL — SIGNIFICANT CHANGE UP (ref 1.8–7.4)
NEUTROPHILS NFR BLD AUTO: 74.8 % — SIGNIFICANT CHANGE UP (ref 43–77)
PHOSPHATE SERPL-MCNC: 2.8 MG/DL — SIGNIFICANT CHANGE UP (ref 2.5–4.5)
PLATELET # BLD AUTO: 466 K/UL — HIGH (ref 150–400)
POTASSIUM SERPL-MCNC: 3.6 MMOL/L — SIGNIFICANT CHANGE UP (ref 3.5–5.3)
POTASSIUM SERPL-SCNC: 3.6 MMOL/L — SIGNIFICANT CHANGE UP (ref 3.5–5.3)
PROT SERPL-MCNC: 8 G/DL — SIGNIFICANT CHANGE UP (ref 6–8.3)
RBC # BLD: 4.61 M/UL — SIGNIFICANT CHANGE UP (ref 3.8–5.2)
RBC # FLD: 21.1 % — HIGH (ref 10.3–14.5)
RH IG SCN BLD-IMP: POSITIVE — SIGNIFICANT CHANGE UP
SODIUM SERPL-SCNC: 140 MMOL/L — SIGNIFICANT CHANGE UP (ref 135–145)
TROPONIN T, HIGH SENSITIVITY RESULT: <6 NG/L — SIGNIFICANT CHANGE UP (ref 0–51)
TSH SERPL-MCNC: 1.77 UIU/ML — SIGNIFICANT CHANGE UP (ref 0.27–4.2)
WBC # BLD: 7.2 K/UL — SIGNIFICANT CHANGE UP (ref 3.8–10.5)
WBC # FLD AUTO: 7.2 K/UL — SIGNIFICANT CHANGE UP (ref 3.8–10.5)

## 2022-09-23 PROCEDURE — 84702 CHORIONIC GONADOTROPIN TEST: CPT

## 2022-09-23 PROCEDURE — 84100 ASSAY OF PHOSPHORUS: CPT

## 2022-09-23 PROCEDURE — 86901 BLOOD TYPING SEROLOGIC RH(D): CPT

## 2022-09-23 PROCEDURE — 86900 BLOOD TYPING SEROLOGIC ABO: CPT

## 2022-09-23 PROCEDURE — 84484 ASSAY OF TROPONIN QUANT: CPT

## 2022-09-23 PROCEDURE — 83735 ASSAY OF MAGNESIUM: CPT

## 2022-09-23 PROCEDURE — 84443 ASSAY THYROID STIM HORMONE: CPT

## 2022-09-23 PROCEDURE — 80053 COMPREHEN METABOLIC PANEL: CPT

## 2022-09-23 PROCEDURE — 86850 RBC ANTIBODY SCREEN: CPT

## 2022-09-23 PROCEDURE — 85025 COMPLETE CBC W/AUTO DIFF WBC: CPT

## 2022-09-23 PROCEDURE — 36415 COLL VENOUS BLD VENIPUNCTURE: CPT

## 2022-09-23 PROCEDURE — 99285 EMERGENCY DEPT VISIT HI MDM: CPT

## 2022-09-23 PROCEDURE — 99283 EMERGENCY DEPT VISIT LOW MDM: CPT

## 2022-09-23 NOTE — ED ADULT NURSE NOTE - OBJECTIVE STATEMENT
Patient presents to Ed with c/o palpitations. Patient with hx of anemia on iv iron infusion for six weeks first infusion  done last Wednesday second this Wednesday reports experiencing palpitations since last night. Patient tachy denies chest  pain or sob no nausea vomiting fever chills cough headache or dizziness reports constipation. Lungs clear bilat, abd  soft non tender non distended skin warm and dry comfort measures provided. LAC 20g iv lock placed labs drawn and   sent.

## 2022-09-23 NOTE — ED PROVIDER NOTE - NSFOLLOWUPINSTRUCTIONS_ED_ALL_ED_FT
You came to the ER today because you had palpitations.     We did an EKG and didn’t see any large abnormality that necessitated that you stay in the hospital for further evaluation however, it is very important that you follow up with your cardiologist in the next 5-7 days and they may recommend an echo or a loop recorder to be placed to monitor your heart.     Additionally, if you pass out or feel like you will pass out, if you develop chest pain, abdominal pain, nausea, vomiting, dark stools, bloody stools, shortness of breath, palpitations please make sure to seek IMMEDIATE MEDICAL ATTENTION as this could be a sign of something more serious, like a heart attack, valve disease, intraabdominal bleeding, gastro intestinal bleeding, or stroke.

## 2022-09-23 NOTE — ED PROVIDER NOTE - EKG ADDITIONAL QUESTION - PERFORMED INDEPENDENT VISUALIZATION
PROGRESS NOTE    April Naranjo [facility] [id number]    1984 35 year old female    Patient is Postpartum day # 1 from vaginal delivery. Patient has no complaints, denies dizziness or SOB.    Vital signs :    Vitals:    07/08/20 0800   BP: 103/68   Pulse: 78   Resp: 16   Temp: 98.2 °F (36.8 °C)       Physical Exam:    General appearance: alert, cooperative and no distress    Abdomen: soft, non-tender; uterus firm and below umbilicus    Extremities: extremities normal, NT    Labs:    HGB   Date Value Ref Range Status   07/08/2020 10.6 (L) 12.0 - 15.5 g/dL Final     HCT   Date Value Ref Range Status   07/08/2020 33.2 (L) 36.0 - 46.5 % Final       Assessment/Plan: Postpartum day  1    GI: continue normal diet as tolerated    Pain: controlled with po medication    DVT Prophylaxis: encourage ambulation    Continue routine PP care    Signed:    Sia Willis M.D.    7/8/20209:35 AM   Yes

## 2022-09-23 NOTE — ED PROVIDER NOTE - NSICDXFAMILYHX_GEN_ALL_CORE_FT
FAMILY HISTORY:  Father  Still living? Unknown  Family history of heart valve abnormality, Age at diagnosis: Age Unknown

## 2022-09-23 NOTE — ED PROVIDER NOTE - NS ED ATTENDING STATEMENT MOD
This was a shared visit with the CURT. I reviewed and verified the documentation and independently performed the documented:

## 2022-09-23 NOTE — ED PROVIDER NOTE - PATIENT PORTAL LINK FT
You can access the FollowMyHealth Patient Portal offered by [facility] by registering at the following website: [email or web address]. By joining [employer]’s FollowMyHealth portal, you will also be able to view your health information using other applications (apps) compatible with our system.

## 2022-09-23 NOTE — ED PROVIDER NOTE - PHYSICAL EXAMINATION
Const: no acute distress, Well-developed,   Eyes: no conjunctival injection and no scleral icterus, conjunctival pallor   ENMT: Moist mucus membranes, no facial droop  CVS: +S1/S2, radial pulse 2+ bilaterally  RESP: Unlabored respiratory effort, Clear to auscultation bilaterally   GI: Nontender/Nondistended soft abdomen, no CVA tenderness   MSK: Extremities w/o deformity or ttp, no lower extremity edema  Psych: Awake, Alert, & Orientedx3;  Appropriate mood and affect, cooperative

## 2022-09-23 NOTE — ED PROVIDER NOTE - CLINICAL SUMMARY MEDICAL DECISION MAKING FREE TEXT BOX
41 F w/ hx of iron deficiency anemia here w/ palpitations started on Wednesday s/p infusion, she reports that she just feels diffent that usual, pt states that she has been under increasing stress, at home w/ her brother dying and mom recently hospitalized no SI/HI, feels safe at home lives w/ sister, no hx of self harm. w/ no fevers, no chills, On exam, pt is awake and alert in no distress, normotensive, mildly tachycardic, has clear lungs conjunctival pallor, plan for labs and reassessment may require blood transfusion follows w/ Dr. Washington w/ prior neg holter

## 2022-09-26 NOTE — PLAN
[FreeTextEntry1] : 40 yo  LMP 22, fibroid uterus. \par \par Discussed the option of continued conservative observation of fibroids vs surgical removal. The risks of surgery including possible infertility and adhesion formation vs the risks of conservative follow up: infertility, miscarriage, and PTL were discussed at length. The possible need for primary  after myomectomy was also outlined. \par \par -rx given for pelvic MRI , prior authorization needed\par -given physical exam, likely a xlap procedure, to reassess following MRI results.\par -to have f/u telehealth apptmt in 4-5 weeks to review pelvic MRI results

## 2022-09-26 NOTE — END OF VISIT
[FreeTextEntry3] : I, Monishaalbino Deleno, acted as a scribe on behalf of Dr. Tiara Garland on 09/21/2022. \par \par All medical entries made by the scribe where at my, Dr. Tiara Garland, direction and personally dictated by me on 09/21/2022. I have reviewed the chart and agree that the record accurately reflects my personal performance of the history, physical exam, assessment, and plan. I have also personally directed, reviewed and agreed with the chart.\par

## 2022-09-26 NOTE — HISTORY OF PRESENT ILLNESS
[FreeTextEntry1] : 40 yo  LMP 22, she is a new pt, presents for consultation regarding fibroids. Referred by Dr. Laura Garza. Reports heavy bleeding, cramping, has become anemic from the heavy bleeding. Does have family h/o thalassemia anemia. She was put on tranexamic acid for the heavy bleeding, caused her to have palpitations. Has cardiac family history, her brother is now  due to aortic disruption. Pt has a healthy diet and exercises regularly. Pt had a pelvic sono in Dr. Garza's office, was told she has endometriosis, do not have prior records. Her periods last 6-7 days, pads last her maximum 2 hours before she has to change it. She has leakage during her periods and reports urinary leakage as well. Denies any pain with intercourse. She is open to having another child. \par \par GYNHx: fibroids\par PMHx: scoliosis, GERD (on Omeprazole)\par FHx: aortic disruption (brother ), thalassemic anemia\par PSHx: c/s x1\par NKDA

## 2022-09-26 NOTE — PHYSICAL EXAM
[Appropriately responsive] : appropriately responsive [Alert] : alert [No Acute Distress] : no acute distress [Soft] : soft [Non-tender] : non-tender [No HSM] : No HSM [No Lesions] : no lesions [Oriented x3] : oriented x3 [FreeTextEntry7] : 24 week mobile uterus.

## 2022-09-26 NOTE — REASON FOR VISIT
[Consultation] : consultation for [FreeTextEntry2] : Pt here today regarding fibroids  [FreeTextEntry1] : Dr. Laura Garza

## 2022-09-28 ENCOUNTER — EMERGENCY (EMERGENCY)
Facility: HOSPITAL | Age: 42
LOS: 1 days | Discharge: HOME | End: 2022-09-28
Attending: STUDENT IN AN ORGANIZED HEALTH CARE EDUCATION/TRAINING PROGRAM
Payer: COMMERCIAL

## 2022-09-28 VITALS
SYSTOLIC BLOOD PRESSURE: 131 MMHG | DIASTOLIC BLOOD PRESSURE: 86 MMHG | TEMPERATURE: 98 F | HEART RATE: 80 BPM | RESPIRATION RATE: 18 BRPM | OXYGEN SATURATION: 98 %

## 2022-09-28 VITALS
RESPIRATION RATE: 16 BRPM | DIASTOLIC BLOOD PRESSURE: 89 MMHG | SYSTOLIC BLOOD PRESSURE: 132 MMHG | HEIGHT: 62 IN | WEIGHT: 175.05 LBS | OXYGEN SATURATION: 100 % | TEMPERATURE: 100 F | HEART RATE: 96 BPM

## 2022-09-28 LAB
ALBUMIN SERPL ELPH-MCNC: 4.6 G/DL — SIGNIFICANT CHANGE UP (ref 3.3–5)
ALP SERPL-CCNC: 60 U/L — SIGNIFICANT CHANGE UP (ref 40–120)
ALT FLD-CCNC: 5 U/L — LOW (ref 10–45)
ANION GAP SERPL CALC-SCNC: 11 MMOL/L — SIGNIFICANT CHANGE UP (ref 5–17)
ANISOCYTOSIS BLD QL: SIGNIFICANT CHANGE UP
APPEARANCE UR: ABNORMAL
AST SERPL-CCNC: 14 U/L — SIGNIFICANT CHANGE UP (ref 10–40)
BACTERIA # UR AUTO: ABNORMAL
BASOPHILS # BLD AUTO: 0.08 K/UL — SIGNIFICANT CHANGE UP (ref 0–0.2)
BASOPHILS NFR BLD AUTO: 0.9 % — SIGNIFICANT CHANGE UP (ref 0–2)
BILIRUB SERPL-MCNC: 0.9 MG/DL — SIGNIFICANT CHANGE UP (ref 0.2–1.2)
BILIRUB UR-MCNC: ABNORMAL
BUN SERPL-MCNC: 4 MG/DL — LOW (ref 7–23)
CALCIUM SERPL-MCNC: 9.6 MG/DL — SIGNIFICANT CHANGE UP (ref 8.4–10.5)
CHLORIDE SERPL-SCNC: 105 MMOL/L — SIGNIFICANT CHANGE UP (ref 96–108)
CO2 SERPL-SCNC: 24 MMOL/L — SIGNIFICANT CHANGE UP (ref 22–31)
COLOR SPEC: ABNORMAL
CREAT SERPL-MCNC: 0.69 MG/DL — SIGNIFICANT CHANGE UP (ref 0.5–1.3)
DIFF PNL FLD: ABNORMAL
EGFR: 111 ML/MIN/1.73M2 — SIGNIFICANT CHANGE UP
EOSINOPHIL # BLD AUTO: 0.08 K/UL — SIGNIFICANT CHANGE UP (ref 0–0.5)
EOSINOPHIL NFR BLD AUTO: 0.9 % — SIGNIFICANT CHANGE UP (ref 0–6)
EPI CELLS # UR: 2 — SIGNIFICANT CHANGE UP
GLUCOSE SERPL-MCNC: 91 MG/DL — SIGNIFICANT CHANGE UP (ref 70–99)
GLUCOSE UR QL: NEGATIVE — SIGNIFICANT CHANGE UP
HCG SERPL-ACNC: <2 MIU/ML — SIGNIFICANT CHANGE UP
HCT VFR BLD CALC: 28.8 % — LOW (ref 34.5–45)
HGB BLD-MCNC: 8.4 G/DL — LOW (ref 11.5–15.5)
HYALINE CASTS # UR AUTO: 0 /LPF — SIGNIFICANT CHANGE UP (ref 0–7)
HYPOCHROMIA BLD QL: SIGNIFICANT CHANGE UP
KETONES UR-MCNC: ABNORMAL
LEUKOCYTE ESTERASE UR-ACNC: ABNORMAL
LYMPHOCYTES # BLD AUTO: 1.19 K/UL — SIGNIFICANT CHANGE UP (ref 1–3.3)
LYMPHOCYTES # BLD AUTO: 14.2 % — SIGNIFICANT CHANGE UP (ref 13–44)
MANUAL SMEAR VERIFICATION: SIGNIFICANT CHANGE UP
MCHC RBC-ENTMCNC: 19.9 PG — LOW (ref 27–34)
MCHC RBC-ENTMCNC: 29.2 GM/DL — LOW (ref 32–36)
MCV RBC AUTO: 68.1 FL — LOW (ref 80–100)
MICROCYTES BLD QL: SIGNIFICANT CHANGE UP
MONOCYTES # BLD AUTO: 0.37 K/UL — SIGNIFICANT CHANGE UP (ref 0–0.9)
MONOCYTES NFR BLD AUTO: 4.4 % — SIGNIFICANT CHANGE UP (ref 2–14)
NEUTROPHILS # BLD AUTO: 6.67 K/UL — SIGNIFICANT CHANGE UP (ref 1.8–7.4)
NEUTROPHILS NFR BLD AUTO: 79.6 % — HIGH (ref 43–77)
NITRITE UR-MCNC: POSITIVE
OVALOCYTES BLD QL SMEAR: SLIGHT — SIGNIFICANT CHANGE UP
PH UR: 7.5 — SIGNIFICANT CHANGE UP (ref 5–8)
PLAT MORPH BLD: NORMAL — SIGNIFICANT CHANGE UP
PLATELET # BLD AUTO: 379 K/UL — SIGNIFICANT CHANGE UP (ref 150–400)
POIKILOCYTOSIS BLD QL AUTO: SIGNIFICANT CHANGE UP
POLYCHROMASIA BLD QL SMEAR: SLIGHT — SIGNIFICANT CHANGE UP
POTASSIUM SERPL-MCNC: 3.7 MMOL/L — SIGNIFICANT CHANGE UP (ref 3.5–5.3)
POTASSIUM SERPL-SCNC: 3.7 MMOL/L — SIGNIFICANT CHANGE UP (ref 3.5–5.3)
PROT SERPL-MCNC: 7.8 G/DL — SIGNIFICANT CHANGE UP (ref 6–8.3)
PROT UR-MCNC: ABNORMAL
RAPID RVP RESULT: SIGNIFICANT CHANGE UP
RBC # BLD: 4.23 M/UL — SIGNIFICANT CHANGE UP (ref 3.8–5.2)
RBC # FLD: 22.6 % — HIGH (ref 10.3–14.5)
RBC BLD AUTO: ABNORMAL
RBC CASTS # UR COMP ASSIST: >50 /HPF — HIGH (ref 0–4)
SARS-COV-2 RNA SPEC QL NAA+PROBE: SIGNIFICANT CHANGE UP
SCHISTOCYTES BLD QL AUTO: SIGNIFICANT CHANGE UP
SODIUM SERPL-SCNC: 140 MMOL/L — SIGNIFICANT CHANGE UP (ref 135–145)
SP GR SPEC: 1.01 — LOW (ref 1.01–1.02)
TROPONIN T, HIGH SENSITIVITY RESULT: <6 NG/L — SIGNIFICANT CHANGE UP (ref 0–51)
UROBILINOGEN FLD QL: NEGATIVE — SIGNIFICANT CHANGE UP
WBC # BLD: 8.38 K/UL — SIGNIFICANT CHANGE UP (ref 3.8–10.5)
WBC # FLD AUTO: 8.38 K/UL — SIGNIFICANT CHANGE UP (ref 3.8–10.5)
WBC UR QL: 5 /HPF — SIGNIFICANT CHANGE UP (ref 0–5)

## 2022-09-28 PROCEDURE — 71046 X-RAY EXAM CHEST 2 VIEWS: CPT | Mod: 26

## 2022-09-28 PROCEDURE — 85025 COMPLETE CBC W/AUTO DIFF WBC: CPT

## 2022-09-28 PROCEDURE — 0225U NFCT DS DNA&RNA 21 SARSCOV2: CPT

## 2022-09-28 PROCEDURE — 81001 URINALYSIS AUTO W/SCOPE: CPT

## 2022-09-28 PROCEDURE — 84702 CHORIONIC GONADOTROPIN TEST: CPT

## 2022-09-28 PROCEDURE — 93005 ELECTROCARDIOGRAM TRACING: CPT

## 2022-09-28 PROCEDURE — 99285 EMERGENCY DEPT VISIT HI MDM: CPT

## 2022-09-28 PROCEDURE — 71046 X-RAY EXAM CHEST 2 VIEWS: CPT

## 2022-09-28 PROCEDURE — 84484 ASSAY OF TROPONIN QUANT: CPT

## 2022-09-28 PROCEDURE — 99285 EMERGENCY DEPT VISIT HI MDM: CPT | Mod: 25

## 2022-09-28 PROCEDURE — 80053 COMPREHEN METABOLIC PANEL: CPT

## 2022-09-28 PROCEDURE — 87086 URINE CULTURE/COLONY COUNT: CPT

## 2022-09-28 RX ORDER — SODIUM CHLORIDE 9 MG/ML
1000 INJECTION INTRAMUSCULAR; INTRAVENOUS; SUBCUTANEOUS ONCE
Refills: 0 | Status: COMPLETED | OUTPATIENT
Start: 2022-09-28 | End: 2022-09-28

## 2022-09-28 RX ORDER — ACETAMINOPHEN 500 MG
975 TABLET ORAL ONCE
Refills: 0 | Status: COMPLETED | OUTPATIENT
Start: 2022-09-28 | End: 2022-09-28

## 2022-09-28 RX ADMIN — Medication 975 MILLIGRAM(S): at 14:06

## 2022-09-28 RX ADMIN — SODIUM CHLORIDE 1000 MILLILITER(S): 9 INJECTION INTRAMUSCULAR; INTRAVENOUS; SUBCUTANEOUS at 14:07

## 2022-09-28 NOTE — ED PROVIDER NOTE - OBJECTIVE STATEMENT
41 F w/ hx of iron deficiency anemia on IV iron infusions  presents to the ED c/o palpitations. Pt reports onset of palpitations at 5am this mornin while at rest which have persisted throughout the morning. She sates she had initial dizziness as well which resoled after Pedialyte and has also been experiencing mild chest tightness. She was seen in ED 5 days ago for the same, d/c home after labs. She reports she had onset of her menses day after ED visit which she feels is contributing to her symptoms stating she typically needs iron when her hemoglobin is less than 10. She denies recent illness, fevers, chills, cough, sob, keen, abd pain, n/v/d. Pt has f/up with her Cardiologist tomorrow

## 2022-09-28 NOTE — ED ADULT NURSE NOTE - NSIMPLEMENTINTERV_GEN_ALL_ED
Implemented All Universal Safety Interventions:  [place] to call system. Call bell, personal items and telephone within reach. Instruct patient to call for assistance. Room bathroom lighting operational. Non-slip footwear when patient is off stretcher. Physically safe environment: no spills, clutter or unnecessary equipment. Stretcher in lowest position, wheels locked, appropriate side rails in place.

## 2022-09-28 NOTE — ED PROVIDER NOTE - ATTENDING APP SHARED VISIT CONTRIBUTION OF CARE
I, Benito Lozano, performed a history and physical exam of the patient and discussed their management with the resident and/or advanced care provider. I reviewed the resident and/or advanced care provider's note and agree with the documented findings and plan of care. I was present and available for all procedures.    41 F w/ hx of iron deficiency anemia on IV iron infusions  presents to the ED c/o palpitations. Pt reports onset of palpitations at 5am this morning while at rest which have persisted throughout the morning. She sates she had initial dizziness as well which resoled after Pedialyte and has also been experiencing mild chest tightness. She was seen in ED 5 days ago for the same, d/c home after labs. She reports she had onset of her menses day after ED visit which she feels is contributing to her symptoms stating she typically needs iron when her hemoglobin is less than 10. She denies recent illness, fevers, chills, cough, sob, keen, abd pain, n/v/d. Pt has f/up with her Cardiologist tomorrow    Well appearing and in NAD, head normal appearing atraumatic, trachea midline, no respiratory distress, lungs cta bilaterally, rrr no murmurs, soft NT ND abdomen, no visible extremity deformities, Alert and oriented, non focal neuro exam, skin warm and dry, normal affect and mood    Well-appearing unlikely ACS PE pneumothorax pneumonia dissection AAA palpitations likely related to possible dehydration or related to anemia with patient on menses right now and baseline iron deficiency anemia otherwise borderline temperature here consider viral illness as etiology will obtain screening blood work electrolytes COVID swab IV fluids as well as Tylenol disposition Pending work-up and reevaluation's

## 2022-09-28 NOTE — ED PROVIDER NOTE - NSFOLLOWUPINSTRUCTIONS_ED_ALL_ED_FT
1. TAKE ALL MEDICATIONS AS DIRECTED.    2. FOR PAIN OR FEVER YOU CAN TAKE IBUPROFEN (MOTRIN, ADVIL) OR ACETAMINOPHEN (TYLENOL) AS NEEDED, AS DIRECTED ON PACKAGING.  3. FOLLOW UP WITH YOUR PRIMARY DOCTOR WITHIN 5 DAYS AS DIRECTED.  4. IF YOU HAD LABS OR IMAGING DONE, YOU WERE GIVEN COPIES OF ALL LABS AND/OR IMAGING RESULTS FROM YOUR ER VISIT--PLEASE TAKE THEM WITH YOU TO YOUR FOLLOW UP APPOINTMENTS.  5. RETURN TO THE ER FOR ANY WORSENING SYMPTOMS OR CONCERNS.    Palpitations    A palpitation is the feeling that your heartbeat is irregular or is faster than normal. It may feel like your heart is fluttering or skipping a beat. They may be caused by many things, including smoking, caffeine, alcohol, stress, and certain medicines. Although most causes of palpitations are not serious, palpitations can be a sign of a serious medical problem. Avoid caffeine, alcohol, and tobacco products at home. Try to reduce stress and anxiety and make sure to get plenty of rest.     SEEK IMMEDIATE MEDICAL CARE IF YOU HAVE ANY OF THE FOLLOWING SYMPTOMS: chest pain, shortness of breath, severe headache, dizziness/lightheadedness, or fainting.

## 2022-09-28 NOTE — ED PROVIDER NOTE - PATIENT PORTAL LINK FT
You can access the FollowMyHealth Patient Portal offered by [facility] by registering at the following website: [email or web address]. By joining [facility]’s FollowMyHealth portal, you will also be able to view your health information using other applications (apps) compatible with our system.

## 2022-09-28 NOTE — ED PROVIDER NOTE - PROGRESS NOTE DETAILS
Pt feeling improved after fluids in the ED. Results of lab work reviewed. Advised pt to f/up with her doctor to determine iron infusion. Possible pt w/ viral syndrome aware RVP pending   Jacinta Espnioza PA-C

## 2022-09-28 NOTE — ED ADULT NURSE NOTE - OBJECTIVE STATEMENT
41 y female presents from home with palpitations. hx of fibroids; patient reports to symptoms last week and was seen and w/u in ED for symptoms. was discharged following labs. reports to requiring iron transfusions for anemia. reports to mild sob and lightheadedness; denies cp, n/v/d, fevers, chills, cough, vision change. a&ox3. skin warm and dry. breathing comfortably in bed- no distress. abdomen soft nondistended. safety maintained- bed locked in lowest position. vss. awaiting labs radiology and dispo.

## 2022-09-29 ENCOUNTER — NON-APPOINTMENT (OUTPATIENT)
Age: 42
End: 2022-09-29

## 2022-09-29 ENCOUNTER — APPOINTMENT (OUTPATIENT)
Dept: CARDIOLOGY | Facility: CLINIC | Age: 42
End: 2022-09-29

## 2022-09-29 VITALS
HEART RATE: 80 BPM | DIASTOLIC BLOOD PRESSURE: 80 MMHG | HEIGHT: 61 IN | SYSTOLIC BLOOD PRESSURE: 123 MMHG | BODY MASS INDEX: 33.04 KG/M2 | OXYGEN SATURATION: 100 % | WEIGHT: 175 LBS

## 2022-09-29 LAB
CULTURE RESULTS: SIGNIFICANT CHANGE UP
SPECIMEN SOURCE: SIGNIFICANT CHANGE UP

## 2022-09-29 PROCEDURE — 93000 ELECTROCARDIOGRAM COMPLETE: CPT

## 2022-09-29 PROCEDURE — 99213 OFFICE O/P EST LOW 20 MIN: CPT | Mod: 25

## 2022-09-29 RX ORDER — OMEPRAZOLE 20 MG/1
20 CAPSULE, DELAYED RELEASE ORAL DAILY
Qty: 30 | Refills: 0 | Status: DISCONTINUED | COMMUNITY
Start: 2022-08-08 | End: 2022-09-29

## 2022-09-29 RX ORDER — IBUPROFEN 800 MG/1
800 TABLET, FILM COATED ORAL
Qty: 90 | Refills: 0 | Status: DISCONTINUED | COMMUNITY
Start: 2022-08-08 | End: 2022-09-29

## 2022-10-02 NOTE — HISTORY OF PRESENT ILLNESS
[FreeTextEntry1] : Shannon is returning due to palps and higher HR in the setting of bleeding fibroids ans severe abemia Hgb 7\par No syncope or blackouts\par No CP\par No LE edema\par

## 2022-10-02 NOTE — DISCUSSION/SUMMARY
[FreeTextEntry1] : ST/palps: related to severe anemia. Encouraged treatment for anemia - incl. iron suppl.\par No further testing unless symptoms continue with correct Hgb. [EKG obtained to assist in diagnosis and management of assessed problem(s)] : EKG obtained to assist in diagnosis and management of assessed problem(s)

## 2022-10-06 ENCOUNTER — OUTPATIENT (OUTPATIENT)
Dept: OUTPATIENT SERVICES | Facility: HOSPITAL | Age: 42
LOS: 1 days | End: 2022-10-06
Payer: COMMERCIAL

## 2022-10-06 ENCOUNTER — APPOINTMENT (OUTPATIENT)
Dept: MRI IMAGING | Facility: CLINIC | Age: 42
End: 2022-10-06

## 2022-10-06 DIAGNOSIS — D25.9 LEIOMYOMA OF UTERUS, UNSPECIFIED: ICD-10-CM

## 2022-10-06 PROCEDURE — A9585: CPT

## 2022-10-06 PROCEDURE — 72197 MRI PELVIS W/O & W/DYE: CPT | Mod: 26

## 2022-10-06 PROCEDURE — 72197 MRI PELVIS W/O & W/DYE: CPT

## 2022-11-02 ENCOUNTER — APPOINTMENT (OUTPATIENT)
Dept: OBGYN | Facility: CLINIC | Age: 42
End: 2022-11-02

## 2022-11-02 DIAGNOSIS — D25.9 LEIOMYOMA OF UTERUS, UNSPECIFIED: ICD-10-CM

## 2022-11-02 PROCEDURE — 99213 OFFICE O/P EST LOW 20 MIN: CPT | Mod: 95

## 2022-11-08 PROBLEM — D25.9 UTERINE FIBROID: Status: ACTIVE | Noted: 2022-09-21

## 2022-11-08 NOTE — HISTORY OF PRESENT ILLNESS
[FreeTextEntry1] : pt presents for follow up consultation to review pelvic MRI. \par \par \par \par EXAM: [id number] - MR PELVIS WAW IC  - ORDERED BY: LAVONNE STOKES\par \par \par PROCEDURE DATE:  10/06/2022\par \par \par \par INTERPRETATION:  CLINICAL INFORMATION: Uterine fibroids.\par \par COMPARISON: None.\par \par CONTRAST/COMPLICATIONS:\par IV Contrast: Gadavist  7.5 cc administered   0 cc discarded\par Oral Contrast: NONE\par Complications: None reported at time of study completion\par \par PROCEDURE:\par MRI of the pelvis was performed.\par Levsin 0.25 mg administered sublingual.\par \par FINDINGS:\par Examination is limited since superior edge of the uterus is not fully included in the study and several series.\par \par UTERUS: 21.5 x 6.7 x 14.8 cm. There is a lower uterine segment  scar. Numerous T1 isointense, T2 hypointense, heterogeneously enhancing leiomyomas are present. The largest intramural fibroid measures 5.4 x 6.3 x 6.1 cm in the lower anterior uterine body.\par There are several submucosal fibroids, with narrowing of the endometrial canal, the largest measuring 2.1 x 2.6 x 2.8 cm. One of these fibroids is a pedunculated fibroid extending into the endometrial cavity measuring 1.7 x 1.3 x 1.1 cm. Numerous additional subserosal fibroids are present. These fibroids demonstrate heterogeneous enhancement. Cervical nabothian cyst. Surrounding the nabothian cyst versus an area of decreased enhancement which is nonspecific. Recommend Pap smear and gynecological exam.\par \par ENDOMETRIUM: Measuring up to 1.3 cm in the uterine fundus. There is irregular narrowing of the endometrial canal by multiple submucosal fibroids.\par \par JUNCTIONAL ZONE: There is diffuse thickening of the junctional zone with scattered foci of high T2 signal involving the anterior uterine body and uterine fundus, consistent with adenomyosis. The thickened junctional zone measures up to 3.4 cm in segment.\par \par RIGHT OVARY: 4.6 x 1.8 cm. Dominant right ovarian follicle.\par LEFT OVARY: 3.2 x 1.3 cm.\par ADNEXA: Limited evaluation the bilateral adnexa, due to the enlarged uterus.\par \par BLADDER: Within normal limits.\par \par LYMPH NODES: No pelvic lymphadenopathy.\par \par VISUALIZED PORTIONS:\par \par ABDOMINAL ORGANS: Within normal limits.\par BOWEL: Within normal limits.\par PERITONEUM: Small amount of free pelvic fluid is noted as well as a loculated collection of fluid between the urinary bladder and lower uterine segment measuring approximately 2.5 x 1.2 x 3.5 cm which is likely a peritoneal inclusion cyst near the  scar.\par VESSELS: Within normal limits.\par ABDOMINAL WALL:  scar and adhesions/tethering of the anterior lower uterine wall to the anterior abdominal wall.\par BONES: Within normal limits.\par \par IMPRESSION:\par Enlarged leiomyomatous uterus including numerous intramural, submucosal, and subserosal leiomyomas are present, the largest intramural leiomyoma measures up to 6.3cm At least 4 submucosal leiomyoma protrude into the endometrial cavity including a probably pedunculated submucosal leiomyoma.\par \par Diffuse adenomyosis with diffuse thickening of the junctional zone, most prominent anteriorly and in the fundus.\par \par Post operative changes from  with peritoneal inclusion cyst.\par \par Hypoenhancing area surrounding the nabothian cysts near the outer cervical orifice which is non specific. Recommend correlation with pap smear and gynecologic examination.\par \par --- End of Report ---\par \par \par \par Plan:\par \par Discussed the option of continued conservative observation of fibroids vs surgical removal. The risks of surgery including possible infertility and adhesion formation vs the risks of conservative followup: infertility, miscarriage, and PTL were discussed at length.  The possible need for primary  after myomectomy was also outlined.\par \par discussed 6-8wks off for vertical incision - abdominal myomectomy  - msg surg sched\par \par march would be best time for surgery\par \par needs pcp clearance and hematology clearance\par \par \par

## 2022-11-16 ENCOUNTER — APPOINTMENT (OUTPATIENT)
Dept: MAMMOGRAPHY | Facility: CLINIC | Age: 42
End: 2022-11-16

## 2022-11-16 ENCOUNTER — APPOINTMENT (OUTPATIENT)
Dept: ULTRASOUND IMAGING | Facility: CLINIC | Age: 42
End: 2022-11-16

## 2022-11-16 NOTE — ED PROVIDER NOTE - NS ED MD EM SELECTION
You might feel nauseated today. Eat lightly until you are feeling better. If nausea continues for more than 24 hours, please call your doctor. If you do not feel nauseated, you may eat anything you like for the rest of the day. [id number] Comprehensive

## 2022-12-13 ENCOUNTER — APPOINTMENT (OUTPATIENT)
Dept: CARDIOLOGY | Facility: CLINIC | Age: 42
End: 2022-12-13

## 2022-12-13 VITALS
SYSTOLIC BLOOD PRESSURE: 134 MMHG | HEART RATE: 92 BPM | DIASTOLIC BLOOD PRESSURE: 84 MMHG | BODY MASS INDEX: 31.91 KG/M2 | HEIGHT: 61 IN | WEIGHT: 169 LBS | OXYGEN SATURATION: 99 %

## 2022-12-13 PROCEDURE — 99213 OFFICE O/P EST LOW 20 MIN: CPT | Mod: 25

## 2022-12-13 PROCEDURE — 93000 ELECTROCARDIOGRAM COMPLETE: CPT

## 2022-12-14 ENCOUNTER — NON-APPOINTMENT (OUTPATIENT)
Age: 42
End: 2022-12-14

## 2022-12-14 NOTE — HISTORY OF PRESENT ILLNESS
[FreeTextEntry1] : Shannon is feeling better since starting iron supplement and correcting her Hgb to 10\par No syncope or blackouts\par No CP\par No LE edema\par Palp a lot better

## 2022-12-14 NOTE — DISCUSSION/SUMMARY
[FreeTextEntry1] : HGb correct to 10 and symptoms markedly better\par No further testing or work-up needed\par May proceed with planned fibroid surgery [EKG obtained to assist in diagnosis and management of assessed problem(s)] : EKG obtained to assist in diagnosis and management of assessed problem(s)

## 2022-12-15 NOTE — ED PROVIDER NOTE - NSDCPRINTRESULTS_ED_ALL_ED
prior CVAs and cervical stenosis None None None spinal stenosis None None None None None cervical stanosis/ h/o CVA Patient requests all Lab, Cardiology, and Radiology Results on their Discharge Instructions

## 2022-12-19 ENCOUNTER — OUTPATIENT (OUTPATIENT)
Dept: OUTPATIENT SERVICES | Facility: HOSPITAL | Age: 42
LOS: 1 days | End: 2022-12-19

## 2022-12-19 DIAGNOSIS — Z00.8 ENCOUNTER FOR OTHER GENERAL EXAMINATION: ICD-10-CM

## 2023-01-09 ENCOUNTER — APPOINTMENT (OUTPATIENT)
Dept: MAMMOGRAPHY | Facility: CLINIC | Age: 43
End: 2023-01-09
Payer: MEDICAID

## 2023-01-09 ENCOUNTER — APPOINTMENT (OUTPATIENT)
Dept: ULTRASOUND IMAGING | Facility: CLINIC | Age: 43
End: 2023-01-09
Payer: MEDICAID

## 2023-01-09 PROCEDURE — 76641 ULTRASOUND BREAST COMPLETE: CPT | Mod: 50

## 2023-01-09 PROCEDURE — 77066 DX MAMMO INCL CAD BI: CPT

## 2023-01-09 PROCEDURE — G0279: CPT

## 2023-01-30 ENCOUNTER — EMERGENCY (EMERGENCY)
Facility: HOSPITAL | Age: 43
LOS: 1 days | Discharge: HOME | End: 2023-01-30
Attending: EMERGENCY MEDICINE
Payer: COMMERCIAL

## 2023-01-30 VITALS
HEART RATE: 95 BPM | WEIGHT: 169.98 LBS | RESPIRATION RATE: 16 BRPM | TEMPERATURE: 99 F | HEIGHT: 62 IN | DIASTOLIC BLOOD PRESSURE: 88 MMHG | OXYGEN SATURATION: 100 % | SYSTOLIC BLOOD PRESSURE: 155 MMHG

## 2023-01-30 PROCEDURE — 99284 EMERGENCY DEPT VISIT MOD MDM: CPT

## 2023-01-30 RX ORDER — SODIUM CHLORIDE 9 MG/ML
1000 INJECTION INTRAMUSCULAR; INTRAVENOUS; SUBCUTANEOUS ONCE
Refills: 0 | Status: COMPLETED | OUTPATIENT
Start: 2023-01-30 | End: 2023-01-30

## 2023-01-30 NOTE — ED PROVIDER NOTE - OBJECTIVE STATEMENT
43 yo female with PMhx fibroids, presenting with LLQ x1 week. Patient states pain is intermittent, but worse today. Patient unable to characterize pain. Patient went to [facility], who recommended paint go to ED. Patient scheduled to have fibroid surgery in March. Patient denies n/v, diarrhea, melena/hematochezia, CP, SOB, fever, and dysuria.

## 2023-01-30 NOTE — ED ADULT TRIAGE NOTE - CHIEF COMPLAINT QUOTE
left lower quadrant abdominal pain x1week. "soreness on left side". went to urgent care and was instructed to come to ED. "softer bowel movements" pmh of fibroids. denies n/v/d/fevers

## 2023-01-30 NOTE — ED PROVIDER NOTE - PATIENT PORTAL LINK FT
You can access the FollowMyHealth Patient Portal offered by [facility] [place] by registering at the following website: [email or web address]. By joining [employer]’s FollowMyHealth portal, you will also be able to view your health information using other applications (apps) compatible with our system.

## 2023-01-30 NOTE — ED PROVIDER NOTE - CLINICAL SUMMARY MEDICAL DECISION MAKING FREE TEXT BOX
41 yo female with PMhx fibroids, presenting with LLQ x1 week. Vitals stable. Tender in LLQ. Palpable masses, fibroids. Concern for diverticulitis, ovarian torsion (less likely given presentation), ovarian cyst, fibroids. Will get labs, CTAP, and TVUS.

## 2023-01-30 NOTE — ED PROVIDER NOTE - ATTENDING CONTRIBUTION TO CARE
MD Kimble:  patient seen and evaluated personally.   I agree with the History & Physical,  Impression & Plan other than what was detailed in my note.  MD Kimble  43 y/o f hx of fibroids, no abd surg, scheduled for fibroid removal in march, presenting w/ llq pain, started 1 week ago, has been waxing and waning, not going to back or r side, no urinary symptoms, no f/c no n/v, no diarrhea, no bloody stools, no vaginal bleeding, afebrile vitals stable  non toxic well appearing, NC/AT,  conjunctiva non conjected, sclera anicteric, moist mucous membranes, neck supple, heart sounds, normal, no mrg, lungs cta b/l no wrr, abd soft non distended w/ pos llq tenderness, no visual deformities of extremities, axox3, , normal mood and affect, possible fibroids causing pain, however possible diverticulitis, will fu ct result from qdoc, add on tvus

## 2023-01-30 NOTE — ED PROVIDER NOTE - NSFOLLOWUPINSTRUCTIONS_ED_ALL_ED_FT
You were seen in the Emergency Department for abdominal pain. Your CAT scan and transvaginal ultrasound did not demonstrate an acute reason for your pain. The imaging did re-visualize your fibroids.     1) Advance activity as tolerated.   2) Continue all previously prescribed medications as directed.    3) Follow up with your OB-GYN within 1-2 weeks - take copies of your results.    4) Return to the Emergency Department for worsening or persistent symptoms, and/or ANY NEW OR CONCERNING SYMPTOMS.

## 2023-01-30 NOTE — ED PROVIDER NOTE - PHYSICAL EXAMINATION
Const: not in acute distress  Eyes: no conjunctival injection  HEENT: Head NCAT, Moist MM.  Neck: Trachea midline.   CVS: +S1/S2, Peripheral pulses 2+ and equal in all extremities.  RESP: Unlabored respiratory effort. Clear to auscultation bilaterally.  GI: Tender in LLQ/Nondistended, Palpable mass in lower abdomenNo CVA tenderness b/l.   MSK: Normocephalic/Atraumatic, No Lower Extremities edema b/l.   Skin: Intact.   Neuro: Motor & Sensation grossly intact.  Psych: Awake, Alert, & Oriented (AAO) x3.

## 2023-01-30 NOTE — ED PROVIDER NOTE - RAPID ASSESSMENT
43 yo female w/ PMHx of fibroids presents to the ED for LLQ pain x1 week. Pt was seen at [facility] earlier who recommended pt go to ED for an ultrasound. Denies N/V/D and urinary symptoms.     Judd PASTOR Gina (Scribe) have documented this rapid assessment note under the dictation of Lebron Cadena) which has been reviewed and affirmed to be accurate. Patient was seen as a QDOC patient. The patient will be seen and further worked up in the main emergency department and their care will be completed by the main emergency department team along with a thorough physical exam. Receiving team will follow up on labs, analgesia, any clinical imaging, reassess and disposition as clinically indicated, all decisions regarding the progression of care will be made at their discretion. 41 yo female w/ PMHx of fibroids presents to the ED for LLQ pain x1 week. Pt was seen at [facility] earlier who recommended pt go to ED for an ultrasound. Denies N/V/D and urinary symptoms. PE WDWN female awake alert mild llq ttp no rebound.   Lebron Cadena MD, Judd Jeffery Gina (Scribe) have documented this rapid assessment note under the dictation of Lebron Cadena (MD) which has been reviewed and affirmed to be accurate. Patient was seen as a QDOC patient. The patient will be seen and further worked up in the main emergency department and their care will be completed by the main emergency department team along with a thorough physical exam. Receiving team will follow up on labs, analgesia, any clinical imaging, reassess and disposition as clinically indicated, all decisions regarding the progression of care will be made at their discretion.  PT seen as Q-doc/Triage with Scribe, full evaluation to be performed once pt is transferred to Main ED  Lebron Cadena MD, Giancarlo

## 2023-01-30 NOTE — ED PROVIDER NOTE - NS ED ROS FT
CONST: no fevers, no chills, no trauma  EYES: no blurry vision   ENT: no sore throat  CV: no chest pain, no extremity swelling  RESP: no shortness of breath  ABD: (+) LLQ abdominal pain, no nausea, no vomiting, no diarrhea, no melena  : no dysuria, no hematuria, no frequency  MSK: no back pain, no neck pain, no extremity pain  NEURO: no headache, no focal weakness, no dizziness  HEME: no bruising  SKIN: no rash

## 2023-01-31 VITALS
TEMPERATURE: 98 F | SYSTOLIC BLOOD PRESSURE: 146 MMHG | DIASTOLIC BLOOD PRESSURE: 85 MMHG | HEART RATE: 76 BPM | OXYGEN SATURATION: 99 % | RESPIRATION RATE: 18 BRPM

## 2023-01-31 DIAGNOSIS — Z98.891 HISTORY OF UTERINE SCAR FROM PREVIOUS SURGERY: Chronic | ICD-10-CM

## 2023-01-31 LAB
ALBUMIN SERPL ELPH-MCNC: 4.8 G/DL — SIGNIFICANT CHANGE UP (ref 3.3–5)
ALP SERPL-CCNC: 66 U/L — SIGNIFICANT CHANGE UP (ref 40–120)
ALT FLD-CCNC: 19 U/L — SIGNIFICANT CHANGE UP (ref 10–45)
ANION GAP SERPL CALC-SCNC: 12 MMOL/L — SIGNIFICANT CHANGE UP (ref 5–17)
ANISOCYTOSIS BLD QL: SIGNIFICANT CHANGE UP
APPEARANCE UR: CLEAR — SIGNIFICANT CHANGE UP
AST SERPL-CCNC: 24 U/L — SIGNIFICANT CHANGE UP (ref 10–40)
BASE EXCESS BLDV CALC-SCNC: 0.9 MMOL/L — SIGNIFICANT CHANGE UP (ref -2–3)
BASOPHILS # BLD AUTO: 0.26 K/UL — HIGH (ref 0–0.2)
BASOPHILS NFR BLD AUTO: 3.5 % — HIGH (ref 0–2)
BILIRUB SERPL-MCNC: 0.5 MG/DL — SIGNIFICANT CHANGE UP (ref 0.2–1.2)
BILIRUB UR-MCNC: NEGATIVE — SIGNIFICANT CHANGE UP
BUN SERPL-MCNC: 9 MG/DL — SIGNIFICANT CHANGE UP (ref 7–23)
CA-I SERPL-SCNC: 1.26 MMOL/L — SIGNIFICANT CHANGE UP (ref 1.15–1.33)
CALCIUM SERPL-MCNC: 10.1 MG/DL — SIGNIFICANT CHANGE UP (ref 8.4–10.5)
CHLORIDE BLDV-SCNC: 103 MMOL/L — SIGNIFICANT CHANGE UP (ref 96–108)
CHLORIDE SERPL-SCNC: 103 MMOL/L — SIGNIFICANT CHANGE UP (ref 96–108)
CO2 BLDV-SCNC: 29 MMOL/L — HIGH (ref 22–26)
CO2 SERPL-SCNC: 25 MMOL/L — SIGNIFICANT CHANGE UP (ref 22–31)
COLOR SPEC: SIGNIFICANT CHANGE UP
CREAT SERPL-MCNC: 0.77 MG/DL — SIGNIFICANT CHANGE UP (ref 0.5–1.3)
DIFF PNL FLD: NEGATIVE — SIGNIFICANT CHANGE UP
EGFR: 98 ML/MIN/1.73M2 — SIGNIFICANT CHANGE UP
ELLIPTOCYTES BLD QL SMEAR: SLIGHT — SIGNIFICANT CHANGE UP
EOSINOPHIL # BLD AUTO: 0.13 K/UL — SIGNIFICANT CHANGE UP (ref 0–0.5)
EOSINOPHIL NFR BLD AUTO: 1.7 % — SIGNIFICANT CHANGE UP (ref 0–6)
FLUAV AG NPH QL: SIGNIFICANT CHANGE UP
FLUBV AG NPH QL: SIGNIFICANT CHANGE UP
GAS PNL BLDV: 138 MMOL/L — SIGNIFICANT CHANGE UP (ref 136–145)
GAS PNL BLDV: SIGNIFICANT CHANGE UP
GAS PNL BLDV: SIGNIFICANT CHANGE UP
GLUCOSE BLDV-MCNC: 92 MG/DL — SIGNIFICANT CHANGE UP (ref 70–99)
GLUCOSE SERPL-MCNC: 93 MG/DL — SIGNIFICANT CHANGE UP (ref 70–99)
GLUCOSE UR QL: NEGATIVE — SIGNIFICANT CHANGE UP
HCO3 BLDV-SCNC: 27 MMOL/L — SIGNIFICANT CHANGE UP (ref 22–29)
HCT VFR BLD CALC: 36.7 % — SIGNIFICANT CHANGE UP (ref 34.5–45)
HCT VFR BLDA CALC: 34 % — LOW (ref 34.5–46.5)
HGB BLD CALC-MCNC: 11.3 G/DL — LOW (ref 11.7–16.1)
HGB BLD-MCNC: 10.8 G/DL — LOW (ref 11.5–15.5)
KETONES UR-MCNC: NEGATIVE — SIGNIFICANT CHANGE UP
LACTATE BLDV-MCNC: 1 MMOL/L — SIGNIFICANT CHANGE UP (ref 0.5–2)
LEUKOCYTE ESTERASE UR-ACNC: NEGATIVE — SIGNIFICANT CHANGE UP
LIDOCAIN IGE QN: 21 U/L — SIGNIFICANT CHANGE UP (ref 7–60)
LYMPHOCYTES # BLD AUTO: 2.74 K/UL — SIGNIFICANT CHANGE UP (ref 1–3.3)
LYMPHOCYTES # BLD AUTO: 36.5 % — SIGNIFICANT CHANGE UP (ref 13–44)
MANUAL SMEAR VERIFICATION: SIGNIFICANT CHANGE UP
MCHC RBC-ENTMCNC: 22.7 PG — LOW (ref 27–34)
MCHC RBC-ENTMCNC: 29.4 GM/DL — LOW (ref 32–36)
MCV RBC AUTO: 77.1 FL — LOW (ref 80–100)
MONOCYTES # BLD AUTO: 0.65 K/UL — SIGNIFICANT CHANGE UP (ref 0–0.9)
MONOCYTES NFR BLD AUTO: 8.7 % — SIGNIFICANT CHANGE UP (ref 2–14)
NEUTROPHILS # BLD AUTO: 3.72 K/UL — SIGNIFICANT CHANGE UP (ref 1.8–7.4)
NEUTROPHILS NFR BLD AUTO: 49.6 % — SIGNIFICANT CHANGE UP (ref 43–77)
NITRITE UR-MCNC: NEGATIVE — SIGNIFICANT CHANGE UP
OVALOCYTES BLD QL SMEAR: SLIGHT — SIGNIFICANT CHANGE UP
PCO2 BLDV: 49 MMHG — HIGH (ref 39–42)
PH BLDV: 7.35 — SIGNIFICANT CHANGE UP (ref 7.32–7.43)
PH UR: 6.5 — SIGNIFICANT CHANGE UP (ref 5–8)
PLAT MORPH BLD: NORMAL — SIGNIFICANT CHANGE UP
PLATELET # BLD AUTO: 407 K/UL — HIGH (ref 150–400)
PO2 BLDV: 23 MMHG — LOW (ref 25–45)
POIKILOCYTOSIS BLD QL AUTO: SLIGHT — SIGNIFICANT CHANGE UP
POLYCHROMASIA BLD QL SMEAR: SLIGHT — SIGNIFICANT CHANGE UP
POTASSIUM BLDV-SCNC: 3.9 MMOL/L — SIGNIFICANT CHANGE UP (ref 3.5–5.1)
POTASSIUM SERPL-MCNC: 4 MMOL/L — SIGNIFICANT CHANGE UP (ref 3.5–5.3)
POTASSIUM SERPL-SCNC: 4 MMOL/L — SIGNIFICANT CHANGE UP (ref 3.5–5.3)
PROT SERPL-MCNC: 7.7 G/DL — SIGNIFICANT CHANGE UP (ref 6–8.3)
PROT UR-MCNC: NEGATIVE — SIGNIFICANT CHANGE UP
RBC # BLD: 4.76 M/UL — SIGNIFICANT CHANGE UP (ref 3.8–5.2)
RBC # FLD: 21.4 % — HIGH (ref 10.3–14.5)
RBC BLD AUTO: ABNORMAL
RSV RNA NPH QL NAA+NON-PROBE: SIGNIFICANT CHANGE UP
SAO2 % BLDV: 25.6 % — LOW (ref 67–88)
SARS-COV-2 RNA SPEC QL NAA+PROBE: SIGNIFICANT CHANGE UP
SCHISTOCYTES BLD QL AUTO: SLIGHT — SIGNIFICANT CHANGE UP
SODIUM SERPL-SCNC: 140 MMOL/L — SIGNIFICANT CHANGE UP (ref 135–145)
SP GR SPEC: 1.02 — SIGNIFICANT CHANGE UP (ref 1.01–1.02)
TARGETS BLD QL SMEAR: SLIGHT — SIGNIFICANT CHANGE UP
UROBILINOGEN FLD QL: NEGATIVE — SIGNIFICANT CHANGE UP
WBC # BLD: 7.51 K/UL — SIGNIFICANT CHANGE UP (ref 3.8–10.5)
WBC # FLD AUTO: 7.51 K/UL — SIGNIFICANT CHANGE UP (ref 3.8–10.5)

## 2023-01-31 PROCEDURE — 71045 X-RAY EXAM CHEST 1 VIEW: CPT | Mod: 26

## 2023-01-31 PROCEDURE — 76856 US EXAM PELVIC COMPLETE: CPT

## 2023-01-31 PROCEDURE — 93005 ELECTROCARDIOGRAM TRACING: CPT

## 2023-01-31 PROCEDURE — 93975 VASCULAR STUDY: CPT | Mod: 26

## 2023-01-31 PROCEDURE — 80053 COMPREHEN METABOLIC PANEL: CPT

## 2023-01-31 PROCEDURE — 76830 TRANSVAGINAL US NON-OB: CPT

## 2023-01-31 PROCEDURE — 85014 HEMATOCRIT: CPT

## 2023-01-31 PROCEDURE — 87637 SARSCOV2&INF A&B&RSV AMP PRB: CPT

## 2023-01-31 PROCEDURE — 93975 VASCULAR STUDY: CPT

## 2023-01-31 PROCEDURE — 82330 ASSAY OF CALCIUM: CPT

## 2023-01-31 PROCEDURE — 76856 US EXAM PELVIC COMPLETE: CPT | Mod: 26,59

## 2023-01-31 PROCEDURE — 83605 ASSAY OF LACTIC ACID: CPT

## 2023-01-31 PROCEDURE — 82435 ASSAY OF BLOOD CHLORIDE: CPT

## 2023-01-31 PROCEDURE — 83690 ASSAY OF LIPASE: CPT

## 2023-01-31 PROCEDURE — 71045 X-RAY EXAM CHEST 1 VIEW: CPT

## 2023-01-31 PROCEDURE — 81003 URINALYSIS AUTO W/O SCOPE: CPT

## 2023-01-31 PROCEDURE — 84295 ASSAY OF SERUM SODIUM: CPT

## 2023-01-31 PROCEDURE — 85025 COMPLETE CBC W/AUTO DIFF WBC: CPT

## 2023-01-31 PROCEDURE — 74177 CT ABD & PELVIS W/CONTRAST: CPT | Mod: 26,MA

## 2023-01-31 PROCEDURE — 84132 ASSAY OF SERUM POTASSIUM: CPT

## 2023-01-31 PROCEDURE — 99285 EMERGENCY DEPT VISIT HI MDM: CPT | Mod: 25

## 2023-01-31 PROCEDURE — 76830 TRANSVAGINAL US NON-OB: CPT | Mod: 26

## 2023-01-31 PROCEDURE — 82803 BLOOD GASES ANY COMBINATION: CPT

## 2023-01-31 PROCEDURE — 85018 HEMOGLOBIN: CPT

## 2023-01-31 PROCEDURE — 82947 ASSAY GLUCOSE BLOOD QUANT: CPT

## 2023-01-31 PROCEDURE — 74177 CT ABD & PELVIS W/CONTRAST: CPT | Mod: MA

## 2023-01-31 RX ADMIN — SODIUM CHLORIDE 1000 MILLILITER(S): 9 INJECTION INTRAMUSCULAR; INTRAVENOUS; SUBCUTANEOUS at 01:47

## 2023-01-31 NOTE — ED ADULT NURSE NOTE - OBJECTIVE STATEMENT
42y Female presents to the ED c/o LLQ abdominal pain x1 week. PMH of fibroids. Pt states she went to [facility] and was told to come to the ED for an US. Pt endorses softer bowl movements. Pt denies n/v/d, dysuria, hematuria, and CP. Pt is A&Ox3, and ambulatory. Respirations spontaneous, unlabored, and equal bilaterally. Patient safety maintained, bed is in lowest position, wheels locked, and appropriate side rails raised.

## 2023-02-13 PROBLEM — U07.1 COVID-19: Chronic | Status: ACTIVE | Noted: 2023-01-31

## 2023-02-21 NOTE — ED ADULT NURSE NOTE - NS ED NURSE LEVEL OF CONSCIOUSNESS ORIENTATION
Ultrasound showing thyroid nodule in the right lobe. Slightly bigger, now 7 mm from 4 mm. Non suspicious. No need for follow up ultrasound.     Thank you. Oriented - self; Oriented - place; Oriented - time

## 2023-03-07 ENCOUNTER — APPOINTMENT (OUTPATIENT)
Dept: CARDIOLOGY | Facility: CLINIC | Age: 43
End: 2023-03-07
Payer: MEDICAID

## 2023-03-07 ENCOUNTER — NON-APPOINTMENT (OUTPATIENT)
Age: 43
End: 2023-03-07

## 2023-03-07 VITALS
DIASTOLIC BLOOD PRESSURE: 84 MMHG | SYSTOLIC BLOOD PRESSURE: 134 MMHG | BODY MASS INDEX: 32.1 KG/M2 | WEIGHT: 170 LBS | HEIGHT: 61 IN | HEART RATE: 102 BPM | OXYGEN SATURATION: 99 %

## 2023-03-07 DIAGNOSIS — R00.2 PALPITATIONS: ICD-10-CM

## 2023-03-07 DIAGNOSIS — R00.0 TACHYCARDIA, UNSPECIFIED: ICD-10-CM

## 2023-03-07 PROCEDURE — 93000 ELECTROCARDIOGRAM COMPLETE: CPT

## 2023-03-07 PROCEDURE — 99213 OFFICE O/P EST LOW 20 MIN: CPT | Mod: 25

## 2023-03-16 ENCOUNTER — OUTPATIENT (OUTPATIENT)
Dept: OUTPATIENT SERVICES | Facility: HOSPITAL | Age: 43
LOS: 1 days | End: 2023-03-16
Payer: MEDICAID

## 2023-03-16 VITALS
OXYGEN SATURATION: 98 % | HEART RATE: 89 BPM | TEMPERATURE: 98 F | SYSTOLIC BLOOD PRESSURE: 133 MMHG | WEIGHT: 175.05 LBS | HEIGHT: 62 IN | DIASTOLIC BLOOD PRESSURE: 90 MMHG | RESPIRATION RATE: 16 BRPM

## 2023-03-16 DIAGNOSIS — Z98.891 HISTORY OF UTERINE SCAR FROM PREVIOUS SURGERY: Chronic | ICD-10-CM

## 2023-03-16 DIAGNOSIS — Z01.818 ENCOUNTER FOR OTHER PREPROCEDURAL EXAMINATION: ICD-10-CM

## 2023-03-16 DIAGNOSIS — D25.9 LEIOMYOMA OF UTERUS, UNSPECIFIED: ICD-10-CM

## 2023-03-16 LAB
ANION GAP SERPL CALC-SCNC: 10 MMOL/L — SIGNIFICANT CHANGE UP (ref 5–17)
BLD GP AB SCN SERPL QL: NEGATIVE — SIGNIFICANT CHANGE UP
BUN SERPL-MCNC: 9 MG/DL — SIGNIFICANT CHANGE UP (ref 7–23)
CALCIUM SERPL-MCNC: 9.3 MG/DL — SIGNIFICANT CHANGE UP (ref 8.4–10.5)
CHLORIDE SERPL-SCNC: 104 MMOL/L — SIGNIFICANT CHANGE UP (ref 96–108)
CO2 SERPL-SCNC: 27 MMOL/L — SIGNIFICANT CHANGE UP (ref 22–31)
CREAT SERPL-MCNC: 0.67 MG/DL — SIGNIFICANT CHANGE UP (ref 0.5–1.3)
EGFR: 111 ML/MIN/1.73M2 — SIGNIFICANT CHANGE UP
GLUCOSE SERPL-MCNC: 107 MG/DL — HIGH (ref 70–99)
HCT VFR BLD CALC: 32.2 % — LOW (ref 34.5–45)
HGB BLD-MCNC: 9.5 G/DL — LOW (ref 11.5–15.5)
MCHC RBC-ENTMCNC: 22.8 PG — LOW (ref 27–34)
MCHC RBC-ENTMCNC: 29.5 GM/DL — LOW (ref 32–36)
MCV RBC AUTO: 77.2 FL — LOW (ref 80–100)
NRBC # BLD: 0 /100 WBCS — SIGNIFICANT CHANGE UP (ref 0–0)
PLATELET # BLD AUTO: 333 K/UL — SIGNIFICANT CHANGE UP (ref 150–400)
POTASSIUM SERPL-MCNC: 4 MMOL/L — SIGNIFICANT CHANGE UP (ref 3.5–5.3)
POTASSIUM SERPL-SCNC: 4 MMOL/L — SIGNIFICANT CHANGE UP (ref 3.5–5.3)
RBC # BLD: 4.17 M/UL — SIGNIFICANT CHANGE UP (ref 3.8–5.2)
RBC # FLD: 17.5 % — HIGH (ref 10.3–14.5)
RH IG SCN BLD-IMP: POSITIVE — SIGNIFICANT CHANGE UP
SODIUM SERPL-SCNC: 141 MMOL/L — SIGNIFICANT CHANGE UP (ref 135–145)
WBC # BLD: 4.88 K/UL — SIGNIFICANT CHANGE UP (ref 3.8–10.5)
WBC # FLD AUTO: 4.88 K/UL — SIGNIFICANT CHANGE UP (ref 3.8–10.5)

## 2023-03-16 PROCEDURE — 85027 COMPLETE CBC AUTOMATED: CPT

## 2023-03-16 PROCEDURE — 80048 BASIC METABOLIC PNL TOTAL CA: CPT

## 2023-03-16 PROCEDURE — G0463: CPT

## 2023-03-16 PROCEDURE — 86900 BLOOD TYPING SEROLOGIC ABO: CPT

## 2023-03-16 PROCEDURE — 86901 BLOOD TYPING SEROLOGIC RH(D): CPT

## 2023-03-16 PROCEDURE — 86850 RBC ANTIBODY SCREEN: CPT

## 2023-03-16 RX ORDER — CHLORHEXIDINE GLUCONATE 213 G/1000ML
1 SOLUTION TOPICAL ONCE
Refills: 0 | Status: DISCONTINUED | OUTPATIENT
Start: 2023-03-28 | End: 2023-04-03

## 2023-03-16 RX ORDER — CEFOTETAN DISODIUM 1 G
2 VIAL (EA) INJECTION ONCE
Refills: 0 | Status: DISCONTINUED | OUTPATIENT
Start: 2023-03-28 | End: 2023-04-03

## 2023-03-16 RX ORDER — LIDOCAINE HCL 20 MG/ML
0.2 VIAL (ML) INJECTION ONCE
Refills: 0 | Status: DISCONTINUED | OUTPATIENT
Start: 2023-03-28 | End: 2023-04-03

## 2023-03-16 NOTE — H&P PST ADULT - FUNCTIONAL STATUS
DASI 9.89 gym 4 x week/4-10 METS DASI 9.89 gym 4 x week able to walk, climb run Denies symptoms/4-10 METS

## 2023-03-16 NOTE — H&P PST ADULT - LAST ECHOCARDIOGRAM
normal stress echo 12/2020  in [facility] ( post Covid , anemia , palpitations ) was evaluated by Dr. Washington ( [employer]scripts ) at present denies any cardiac symptoms , exercises daily

## 2023-03-16 NOTE — H&P PST ADULT - PROBLEM SELECTOR PLAN 1
abdominal myomectomy ( vertical incision )   PST instructions provided, Gatorade preop  , surgical scrub given, patient verbalized understanding   CBC, BMP, T&S collected and sent   Covid PCR 3/25 @ [facility]   PCP eval pending, patient already has appointment   Cardiology eval 12/13/2022 on chart  [facility] on admission

## 2023-03-16 NOTE — H&P PST ADULT - NSICDXFAMILYHX_GEN_ALL_CORE_FT
FAMILY HISTORY:  Father  Still living? No  Family history of heart valve abnormality, Age at diagnosis: 71-80

## 2023-03-16 NOTE — H&P PST ADULT - OTHER CARE PROVIDERS
Heme Dr. Jackson Malcolm [phone] last visit 3/15 stable anemia          cardio Dr. Washington  3/7/2023 Heme Dr. Jackson Malcolm [phone] last visit 3/15 stable anemia          cardio Dr. Washington  clearance on chart 12/13/2022

## 2023-03-16 NOTE — H&P PST ADULT - HISTORY OF PRESENT ILLNESS
42 yr old F with history of uterine fibroids, heavy monthly periods,  LMP 3/11/2023, iron deficiency  anemia, iron infusions ( last 5/2022), history of palpitations due to anemia, presents to [facility] for scheduled abdominal myomectomy ( vertical incision ) on 3/28/2023. Patient feeling well, denies fever, chills, no acute complaints. Had Covid 3/2020 and 12/2022, denies Covid complications. Vaccinated. Preop Covid PCR 3/25/2023 @ [facility].

## 2023-03-16 NOTE — H&P PST ADULT - NSICDXPASTMEDICALHX_GEN_ALL_CORE_FT
PAST MEDICAL HISTORY:  2019 novel coronavirus disease (COVID-19)     Iron deficiency anemia      PAST MEDICAL HISTORY:  2019 novel coronavirus disease (COVID-19)     History of palpitations     Iron deficiency anemia     Leiomyoma of uterus     Obesity (BMI 30.0-34.9)

## 2023-03-16 NOTE — H&P PST ADULT - ATTENDING COMMENTS
pt seen and plan discussed. to proceed with scheduled procedure. all questions answered. informed consent signed and witnessed.    kiana torres

## 2023-03-16 NOTE — H&P PST ADULT - ASSESSMENT
CAPRINI VTE 2.0 SCORE [CLOT updated 2019]    AGE RELATED RISK FACTORS                                                       MOBILITY RELATED FACTORS  [x ] Age 41-60 years                                            (1 Point)                    [ ] Bed rest                                                        (1 Point)  [ ] Age: 61-74 years                                           (2 Points)                  [ ] Plaster cast                                                   (2 Points)  [ ] Age= 75 years                                              (3 Points)                    [ ] Bed bound for more than 72 hours                 (2 Points)    DISEASE RELATED RISK FACTORS                                               GENDER SPECIFIC FACTORS  [ ] Edema in the lower extremities                       (1 Point)              [ ] Pregnancy                                                     (1 Point)  [ ] Varicose veins                                               (1 Point)                     [ ] Post-partum < 6 weeks                                   (1 Point)             [x ] BMI > 25 Kg/m2                                            (1 Point)                     [ ] Hormonal therapy  or oral contraception          (1 Point)                 [ ] Sepsis (in the previous month)                        (1 Point)               [ ] History of pregnancy complications                 (1 point)  [ ] Pneumonia or serious lung disease                                               [ ] Unexplained or recurrent                     (1 Point)           (in the previous month)                               (1 Point)  [ ] Abnormal pulmonary function test                     (1 Point)                 SURGERY RELATED RISK FACTORS  [ ] Acute myocardial infarction                              (1 Point)               [ ]  Section                                             (1 Point)  [ ] Congestive heart failure (in the previous month)  (1 Point)      [ ] Minor surgery                                                  (1 Point)   [ ] Inflammatory bowel disease                             (1 Point)               [ ] Arthroscopic surgery                                        (2 Points)  [ ] Central venous access                                      (2 Points)                [x ] General surgery lasting more than 45 minutes (2 points)  [ ] Malignancy- Present or previous                   (2 Points)                [ ] Elective arthroplasty                                         (5 points)    [ ] Stroke (in the previous month)                          (5 Points)                                                                                                                                                           HEMATOLOGY RELATED FACTORS                                                 TRAUMA RELATED RISK FACTORS  [ ] Prior episodes of VTE                                     (3 Points)                [ ] Fracture of the hip, pelvis, or leg                       (5 Points)  [ ] Positive family history for VTE                         (3 Points)             [ ] Acute spinal cord injury (in the previous month)  (5 Points)  [ ] Prothrombin 72900 A                                     (3 Points)               [ ] Paralysis  (less than 1 month)                             (5 Points)  [ ] Factor V Leiden                                             (3 Points)                  [ ] Multiple Trauma within 1 month                        (5 Points)  [ ] Lupus anticoagulants                                     (3 Points)                                                           [ ] Anticardiolipin antibodies                               (3 Points)                                                       [ ] High homocysteine in the blood                      (3 Points)                                             [ ] Other congenital or acquired thrombophilia      (3 Points)                                                [ ] Heparin induced thrombocytopenia                  (3 Points)                                     Total Score [    4      ]    Airway : no airway abnormalities , denies prior anesthesia complications   Mallampati : II  Denies loose teeth    Chaim abrasion risk : Denies

## 2023-03-25 ENCOUNTER — OUTPATIENT (OUTPATIENT)
Dept: OUTPATIENT SERVICES | Facility: HOSPITAL | Age: 43
LOS: 1 days | End: 2023-03-25
Payer: COMMERCIAL

## 2023-03-25 DIAGNOSIS — Z98.891 HISTORY OF UTERINE SCAR FROM PREVIOUS SURGERY: Chronic | ICD-10-CM

## 2023-03-25 DIAGNOSIS — Z11.52 ENCOUNTER FOR SCREENING FOR COVID-19: ICD-10-CM

## 2023-03-25 LAB — SARS-COV-2 RNA SPEC QL NAA+PROBE: SIGNIFICANT CHANGE UP

## 2023-03-25 PROCEDURE — U0003: CPT

## 2023-03-25 PROCEDURE — C9803: CPT

## 2023-03-25 PROCEDURE — U0005: CPT

## 2023-03-26 PROBLEM — Z87.898 PERSONAL HISTORY OF OTHER SPECIFIED CONDITIONS: Chronic | Status: ACTIVE | Noted: 2023-03-16

## 2023-03-26 PROBLEM — D25.9 LEIOMYOMA OF UTERUS, UNSPECIFIED: Chronic | Status: ACTIVE | Noted: 2023-03-16

## 2023-03-26 PROBLEM — E66.9 OBESITY, UNSPECIFIED: Chronic | Status: ACTIVE | Noted: 2023-03-16

## 2023-03-27 ENCOUNTER — TRANSCRIPTION ENCOUNTER (OUTPATIENT)
Age: 43
End: 2023-03-27

## 2023-03-28 ENCOUNTER — APPOINTMENT (OUTPATIENT)
Dept: OBGYN | Facility: HOSPITAL | Age: 43
End: 2023-03-28

## 2023-03-28 ENCOUNTER — TRANSCRIPTION ENCOUNTER (OUTPATIENT)
Age: 43
End: 2023-03-28

## 2023-03-28 ENCOUNTER — INPATIENT (INPATIENT)
Facility: HOSPITAL | Age: 43
LOS: 5 days | Discharge: HOME | DRG: 742 | End: 2023-04-03
Attending: STUDENT IN AN ORGANIZED HEALTH CARE EDUCATION/TRAINING PROGRAM | Admitting: STUDENT IN AN ORGANIZED HEALTH CARE EDUCATION/TRAINING PROGRAM
Payer: COMMERCIAL

## 2023-03-28 VITALS
SYSTOLIC BLOOD PRESSURE: 149 MMHG | TEMPERATURE: 98 F | HEART RATE: 92 BPM | OXYGEN SATURATION: 99 % | RESPIRATION RATE: 18 BRPM | WEIGHT: 175.05 LBS | HEIGHT: 62.01 IN | DIASTOLIC BLOOD PRESSURE: 86 MMHG

## 2023-03-28 DIAGNOSIS — D25.9 LEIOMYOMA OF UTERUS, UNSPECIFIED: ICD-10-CM

## 2023-03-28 DIAGNOSIS — Z98.891 HISTORY OF UTERINE SCAR FROM PREVIOUS SURGERY: Chronic | ICD-10-CM

## 2023-03-28 LAB
BASE EXCESS BLDV CALC-SCNC: 0.8 MMOL/L — SIGNIFICANT CHANGE UP (ref -2–3)
BASOPHILS # BLD AUTO: 0.05 K/UL — SIGNIFICANT CHANGE UP (ref 0–0.2)
BASOPHILS NFR BLD AUTO: 0.3 % — SIGNIFICANT CHANGE UP (ref 0–2)
BLOOD GAS VENOUS - CREATININE: SIGNIFICANT CHANGE UP MG/DL (ref 0.5–1.3)
CA-I SERPL-SCNC: 1.18 MMOL/L — SIGNIFICANT CHANGE UP (ref 1.15–1.33)
CHLORIDE BLDV-SCNC: 104 MMOL/L — SIGNIFICANT CHANGE UP (ref 96–108)
CO2 BLDV-SCNC: 26 MMOL/L — SIGNIFICANT CHANGE UP (ref 22–26)
EOSINOPHIL # BLD AUTO: 0 K/UL — SIGNIFICANT CHANGE UP (ref 0–0.5)
EOSINOPHIL NFR BLD AUTO: 0 % — SIGNIFICANT CHANGE UP (ref 0–6)
GAS PNL BLDV: 136 MMOL/L — SIGNIFICANT CHANGE UP (ref 136–145)
GAS PNL BLDV: SIGNIFICANT CHANGE UP
GAS PNL BLDV: SIGNIFICANT CHANGE UP
GLUCOSE BLDV-MCNC: 105 MG/DL — HIGH (ref 70–99)
HCG UR QL: NEGATIVE — SIGNIFICANT CHANGE UP
HCO3 BLDV-SCNC: 25 MMOL/L — SIGNIFICANT CHANGE UP (ref 22–29)
HCT VFR BLD CALC: 29.2 % — LOW (ref 34.5–45)
HCT VFR BLDA CALC: 26 % — LOW (ref 34.5–46.5)
HGB BLD CALC-MCNC: 8.6 G/DL — LOW (ref 11.7–16.1)
HGB BLD-MCNC: 8.5 G/DL — LOW (ref 11.5–15.5)
IMM GRANULOCYTES NFR BLD AUTO: 0.8 % — SIGNIFICANT CHANGE UP (ref 0–0.9)
LACTATE BLDV-MCNC: 1.6 MMOL/L — SIGNIFICANT CHANGE UP (ref 0.5–2)
LYMPHOCYTES # BLD AUTO: 1.2 K/UL — SIGNIFICANT CHANGE UP (ref 1–3.3)
LYMPHOCYTES # BLD AUTO: 8.2 % — LOW (ref 13–44)
MCHC RBC-ENTMCNC: 22.8 PG — LOW (ref 27–34)
MCHC RBC-ENTMCNC: 29.1 GM/DL — LOW (ref 32–36)
MCV RBC AUTO: 78.5 FL — LOW (ref 80–100)
MONOCYTES # BLD AUTO: 0.44 K/UL — SIGNIFICANT CHANGE UP (ref 0–0.9)
MONOCYTES NFR BLD AUTO: 3 % — SIGNIFICANT CHANGE UP (ref 2–14)
NEUTROPHILS # BLD AUTO: 12.75 K/UL — HIGH (ref 1.8–7.4)
NEUTROPHILS NFR BLD AUTO: 87.7 % — HIGH (ref 43–77)
NRBC # BLD: 0 /100 WBCS — SIGNIFICANT CHANGE UP (ref 0–0)
PCO2 BLDV: 39 MMHG — SIGNIFICANT CHANGE UP (ref 39–42)
PH BLDV: 7.42 — SIGNIFICANT CHANGE UP (ref 7.32–7.43)
PLATELET # BLD AUTO: 324 K/UL — SIGNIFICANT CHANGE UP (ref 150–400)
PO2 BLDV: 165 MMHG — HIGH (ref 25–45)
POTASSIUM BLDV-SCNC: 3.4 MMOL/L — LOW (ref 3.5–5.1)
RBC # BLD: 3.72 M/UL — LOW (ref 3.8–5.2)
RBC # FLD: 17.2 % — HIGH (ref 10.3–14.5)
SAO2 % BLDV: 100 % — HIGH (ref 67–88)
WBC # BLD: 14.55 K/UL — HIGH (ref 3.8–10.5)
WBC # FLD AUTO: 14.55 K/UL — HIGH (ref 3.8–10.5)

## 2023-03-28 PROCEDURE — 88305 TISSUE EXAM BY PATHOLOGIST: CPT | Mod: 26

## 2023-03-28 PROCEDURE — 58140 MYOMECTOMY ABDOM METHOD: CPT

## 2023-03-28 DEVICE — INTERCEED 5 X 6" XL: Type: IMPLANTABLE DEVICE | Status: FUNCTIONAL

## 2023-03-28 DEVICE — VISTASEAL FIBRIN HUMAN 4ML: Type: IMPLANTABLE DEVICE | Status: FUNCTIONAL

## 2023-03-28 RX ORDER — HYDROMORPHONE HYDROCHLORIDE 2 MG/ML
0.5 INJECTION INTRAMUSCULAR; INTRAVENOUS; SUBCUTANEOUS
Refills: 0 | Status: DISCONTINUED | OUTPATIENT
Start: 2023-03-28 | End: 2023-03-29

## 2023-03-28 RX ORDER — ACETAMINOPHEN 500 MG
1000 TABLET ORAL ONCE
Refills: 0 | Status: COMPLETED | OUTPATIENT
Start: 2023-03-28 | End: 2023-03-28

## 2023-03-28 RX ORDER — SODIUM CHLORIDE 9 MG/ML
1000 INJECTION, SOLUTION INTRAVENOUS
Refills: 0 | Status: DISCONTINUED | OUTPATIENT
Start: 2023-03-28 | End: 2023-03-29

## 2023-03-28 RX ORDER — NALOXONE HYDROCHLORIDE 4 MG/.1ML
0.1 SPRAY NASAL
Refills: 0 | Status: DISCONTINUED | OUTPATIENT
Start: 2023-03-28 | End: 2023-03-29

## 2023-03-28 RX ORDER — ONDANSETRON 8 MG/1
4 TABLET, FILM COATED ORAL EVERY 6 HOURS
Refills: 0 | Status: DISCONTINUED | OUTPATIENT
Start: 2023-03-28 | End: 2023-03-29

## 2023-03-28 RX ORDER — ACETAMINOPHEN 500 MG
1000 TABLET ORAL ONCE
Refills: 0 | Status: COMPLETED | OUTPATIENT
Start: 2023-03-29 | End: 2023-03-29

## 2023-03-28 RX ORDER — FERROUS SULFATE 325(65) MG
0 TABLET ORAL
Qty: 0 | Refills: 0 | DISCHARGE

## 2023-03-28 RX ORDER — SIMETHICONE 80 MG/1
80 TABLET, CHEWABLE ORAL EVERY 6 HOURS
Refills: 0 | Status: DISCONTINUED | OUTPATIENT
Start: 2023-03-28 | End: 2023-04-03

## 2023-03-28 RX ORDER — CELECOXIB 200 MG/1
400 CAPSULE ORAL ONCE
Refills: 0 | Status: COMPLETED | OUTPATIENT
Start: 2023-03-28 | End: 2023-03-28

## 2023-03-28 RX ORDER — HYDROMORPHONE HYDROCHLORIDE 2 MG/ML
0.5 INJECTION INTRAMUSCULAR; INTRAVENOUS; SUBCUTANEOUS
Refills: 0 | Status: DISCONTINUED | OUTPATIENT
Start: 2023-03-28 | End: 2023-03-28

## 2023-03-28 RX ORDER — SODIUM CHLORIDE 9 MG/ML
3 INJECTION INTRAMUSCULAR; INTRAVENOUS; SUBCUTANEOUS EVERY 8 HOURS
Refills: 0 | Status: DISCONTINUED | OUTPATIENT
Start: 2023-03-28 | End: 2023-03-28

## 2023-03-28 RX ORDER — SODIUM CHLORIDE 9 MG/ML
1000 INJECTION, SOLUTION INTRAVENOUS
Refills: 0 | Status: DISCONTINUED | OUTPATIENT
Start: 2023-03-28 | End: 2023-03-28

## 2023-03-28 RX ORDER — HYDROMORPHONE HYDROCHLORIDE 2 MG/ML
30 INJECTION INTRAMUSCULAR; INTRAVENOUS; SUBCUTANEOUS
Refills: 0 | Status: DISCONTINUED | OUTPATIENT
Start: 2023-03-28 | End: 2023-03-29

## 2023-03-28 RX ORDER — GABAPENTIN 400 MG/1
600 CAPSULE ORAL ONCE
Refills: 0 | Status: COMPLETED | OUTPATIENT
Start: 2023-03-28 | End: 2023-03-28

## 2023-03-28 RX ORDER — HEPARIN SODIUM 5000 [USP'U]/ML
5000 INJECTION INTRAVENOUS; SUBCUTANEOUS EVERY 12 HOURS
Refills: 0 | Status: DISCONTINUED | OUTPATIENT
Start: 2023-03-28 | End: 2023-04-03

## 2023-03-28 RX ORDER — KETOROLAC TROMETHAMINE 30 MG/ML
15 SYRINGE (ML) INJECTION EVERY 6 HOURS
Refills: 0 | Status: DISCONTINUED | OUTPATIENT
Start: 2023-03-28 | End: 2023-03-29

## 2023-03-28 RX ADMIN — HYDROMORPHONE HYDROCHLORIDE 30 MILLILITER(S): 2 INJECTION INTRAMUSCULAR; INTRAVENOUS; SUBCUTANEOUS at 18:12

## 2023-03-28 RX ADMIN — Medication 1000 MILLIGRAM(S): at 08:14

## 2023-03-28 RX ADMIN — HYDROMORPHONE HYDROCHLORIDE 0.5 MILLIGRAM(S): 2 INJECTION INTRAMUSCULAR; INTRAVENOUS; SUBCUTANEOUS at 16:05

## 2023-03-28 RX ADMIN — Medication 1000 MILLIGRAM(S): at 21:45

## 2023-03-28 RX ADMIN — Medication 15 MILLIGRAM(S): at 23:53

## 2023-03-28 RX ADMIN — ONDANSETRON 4 MILLIGRAM(S): 8 TABLET, FILM COATED ORAL at 20:05

## 2023-03-28 RX ADMIN — HYDROMORPHONE HYDROCHLORIDE 0.5 MILLIGRAM(S): 2 INJECTION INTRAMUSCULAR; INTRAVENOUS; SUBCUTANEOUS at 15:00

## 2023-03-28 RX ADMIN — CELECOXIB 400 MILLIGRAM(S): 200 CAPSULE ORAL at 08:14

## 2023-03-28 RX ADMIN — HYDROMORPHONE HYDROCHLORIDE 30 MILLILITER(S): 2 INJECTION INTRAMUSCULAR; INTRAVENOUS; SUBCUTANEOUS at 15:08

## 2023-03-28 RX ADMIN — Medication 400 MILLIGRAM(S): at 21:14

## 2023-03-28 RX ADMIN — HYDROMORPHONE HYDROCHLORIDE 30 MILLILITER(S): 2 INJECTION INTRAMUSCULAR; INTRAVENOUS; SUBCUTANEOUS at 19:38

## 2023-03-28 RX ADMIN — GABAPENTIN 600 MILLIGRAM(S): 400 CAPSULE ORAL at 08:14

## 2023-03-28 NOTE — CHART NOTE - NSCHARTNOTEFT_GEN_A_CORE
GYN POST-OP CHECK    Allergies: No Known Allergies    S: Pt awake and alert resting comfortably in bed.    Pain controlled. Pt denies N/V, SOB, CP, palpitations. Tolerates clears.  Not OOB yet.    O:   T(C): 36 (23 @ 14:05), Max: 36 (23 @ 14:05)  HR: 92 (23 @ 15:00) (88 - 92)  BP: 129/78 (23 @ 15:00) (113/74 - 129/78)  RR: 16 (23 @ 15:00) (14 - 19)  SpO2: 100% (23 @ 15:00) (97% - 100%)  I&O's Summary    28 Mar 2023 07:01  -  28 Mar 2023 16:13  --------------------------------------------------------  IN: 250 mL / OUT: 525 mL / NET: -275 mL      Gen: NAD. A+Ox3  CV: S1S2, RRR  Lungs: CTA B/L  Abd: +BS. soft, gently distended and appropriately tender  Inc: Clean/dry/intact  Ext: PAS in place    A/P: 42y Female now POD#0 s/p abdominal myomectomy.     PAST MEDICAL & SURGICAL HISTORY: Iron deficiency anemia, Leiomyoma of uterus, Obesity (BMI 30.0-34.9), H/O  section    1. Neuro: Analgesia PRN.   acetaminophen   IVPB .. 1000 milliGRAM(s) IV Intermittent once  HYDROmorphone  Injectable 0.5 milliGRAM(s) IV Push every 10 minutes PRN  HYDROmorphone PCA (1 mG/mL) 30 milliLiter(s) PCA Continuous PCA Continuous  HYDROmorphone PCA (1 mG/mL) Rescue Clinician Bolus 0.5 milliGRAM(s) IV Push every 15 minutes PRN  ketorolac   Injectable 15 milliGRAM(s) IV Push every 6 hours  ondansetron Injectable 4 milliGRAM(s) IV Push every 6 hours PRN  2. Card: Monitor VS. STAT CBC stable. Repeat CBC in AM.   3. Pulm: Incentive spirometer use.   4. GI: Advance to regular diet. Anti-emetics PRN.  5. : Leigh to gravity until AM.  6. Electrolytes: LR@125cc/hr.  7. DVT ppx w/ PAS while in bed. Early ambulation, initially with assistance then as tolerated.  8. Discharge from PACU when criteria met.     d/w GYN team  Trixie Thompson PA-C

## 2023-03-28 NOTE — PATIENT PROFILE ADULT - FALL HARM RISK - UNIVERSAL INTERVENTIONS
Bed in lowest position, wheels locked, appropriate side rails in place/Call bell, personal items and telephone in reach/Instruct patient to call for assistance before getting out of bed or chair/Non-slip footwear when patient is out of bed/[place] to call system/Physically safe environment - no spills, clutter or unnecessary equipment/Purposeful Proactive Rounding/Room/bathroom lighting operational, light cord in reach

## 2023-03-28 NOTE — BRIEF OPERATIVE NOTE - OPERATION/FINDINGS
EUA: Mobile 20 week sized uterus. Adnexa not palpable bilaterally.   Laparotomy: Fibroid uterus. Normal appearing ovaries and tubes bilaterally. Hemorrhagic corpus luteal cyst noted on left ovary. Large lower uterine segment fibroid. Multiple intracavitary fibroids. Multiple posterior fibroids. Multiple pedunculated fundal fibroids. 39 fibroids removed in total. Vistaseal and intercede placed over the uterus anteriorly and posteriorly. Excellent hemostasis.

## 2023-03-28 NOTE — PATIENT PROFILE ADULT - LEGAL HELP
Burn is healing fine and there is no evidence of infection    Noted swelling is part of the healing process and should gradually resolve over the next 1 or 2 weeks    Continue to apply the ointment to the area of burn to help reduce scarring and promote better healing    If you experience more redness or tenderness around the burn, should return to clinic for recheck    Anticipate improvement of your symptoms every 3 days and essential return to normal probably over 3-4 weeks   no

## 2023-03-28 NOTE — PATIENT PROFILE ADULT - NSTRANSFERBELONGINGSDISPO_GEN_A_NUR
Render In Bullet Format When Appropriate: No
Detail Level: Zone
Consent: The patient's consent was obtained including but not limited to risks of crusting, scabbing, blistering, scarring, darker or lighter pigmentary change, recurrence, incomplete removal and infection.
Post-Care Instructions: I reviewed with the patient in detail post-care instructions. Patient is to wear sunprotection, and avoid picking at any of the treated lesions. Pt may apply Vaseline to crusted or scabbing areas.
Duration Of Freeze Thaw-Cycle (Seconds): 5
Total Number Of Aks Treated: 1
Number Of Freeze-Thaw Cycles: 3 freeze-thaw cycles
Show Spray Paint Technique Variable?: Yes
Detail Level: Simple
Medical Necessity Information: It is in your best interest to select a reason for this procedure from the list below. All of these items fulfill various CMS LCD requirements except the new and changing color options.
Medical Necessity Clause: This procedure was medically necessary because the lesions that were treated were:
Spray Paint Text: The liquid nitrogen was applied to the skin utilizing a spray paint frosting technique.
with patient

## 2023-03-29 ENCOUNTER — TRANSCRIPTION ENCOUNTER (OUTPATIENT)
Age: 43
End: 2023-03-29

## 2023-03-29 DIAGNOSIS — Z29.9 ENCOUNTER FOR PROPHYLACTIC MEASURES, UNSPECIFIED: ICD-10-CM

## 2023-03-29 DIAGNOSIS — D62 ACUTE POSTHEMORRHAGIC ANEMIA: ICD-10-CM

## 2023-03-29 DIAGNOSIS — D50.9 IRON DEFICIENCY ANEMIA, UNSPECIFIED: ICD-10-CM

## 2023-03-29 LAB
BASOPHILS # BLD AUTO: 0.02 K/UL — SIGNIFICANT CHANGE UP (ref 0–0.2)
BASOPHILS NFR BLD AUTO: 0.1 % — SIGNIFICANT CHANGE UP (ref 0–2)
EOSINOPHIL # BLD AUTO: 0 K/UL — SIGNIFICANT CHANGE UP (ref 0–0.5)
EOSINOPHIL NFR BLD AUTO: 0 % — SIGNIFICANT CHANGE UP (ref 0–6)
HCT VFR BLD CALC: 26 % — LOW (ref 34.5–45)
HGB BLD-MCNC: 7.6 G/DL — LOW (ref 11.5–15.5)
IMM GRANULOCYTES NFR BLD AUTO: 0.4 % — SIGNIFICANT CHANGE UP (ref 0–0.9)
LYMPHOCYTES # BLD AUTO: 1.56 K/UL — SIGNIFICANT CHANGE UP (ref 1–3.3)
LYMPHOCYTES # BLD AUTO: 11.5 % — LOW (ref 13–44)
MCHC RBC-ENTMCNC: 22.8 PG — LOW (ref 27–34)
MCHC RBC-ENTMCNC: 29.2 GM/DL — LOW (ref 32–36)
MCV RBC AUTO: 77.8 FL — LOW (ref 80–100)
MONOCYTES # BLD AUTO: 1.23 K/UL — HIGH (ref 0–0.9)
MONOCYTES NFR BLD AUTO: 9 % — SIGNIFICANT CHANGE UP (ref 2–14)
NEUTROPHILS # BLD AUTO: 10.74 K/UL — HIGH (ref 1.8–7.4)
NEUTROPHILS NFR BLD AUTO: 79 % — HIGH (ref 43–77)
NRBC # BLD: 0 /100 WBCS — SIGNIFICANT CHANGE UP (ref 0–0)
PLATELET # BLD AUTO: 336 K/UL — SIGNIFICANT CHANGE UP (ref 150–400)
RBC # BLD: 3.34 M/UL — LOW (ref 3.8–5.2)
RBC # FLD: 17.2 % — HIGH (ref 10.3–14.5)
WBC # BLD: 13.61 K/UL — HIGH (ref 3.8–10.5)
WBC # FLD AUTO: 13.61 K/UL — HIGH (ref 3.8–10.5)

## 2023-03-29 RX ORDER — ACETAMINOPHEN 500 MG
3 TABLET ORAL
Qty: 0 | Refills: 0 | DISCHARGE

## 2023-03-29 RX ORDER — SENNA PLUS 8.6 MG/1
2 TABLET ORAL AT BEDTIME
Refills: 0 | Status: DISCONTINUED | OUTPATIENT
Start: 2023-03-29 | End: 2023-04-01

## 2023-03-29 RX ORDER — OXYCODONE HYDROCHLORIDE 5 MG/1
1 TABLET ORAL
Qty: 10 | Refills: 0
Start: 2023-03-29

## 2023-03-29 RX ORDER — ACETAMINOPHEN 500 MG
975 TABLET ORAL EVERY 6 HOURS
Refills: 0 | Status: DISCONTINUED | OUTPATIENT
Start: 2023-03-29 | End: 2023-03-31

## 2023-03-29 RX ORDER — IBUPROFEN 200 MG
600 TABLET ORAL EVERY 6 HOURS
Refills: 0 | Status: DISCONTINUED | OUTPATIENT
Start: 2023-03-29 | End: 2023-03-31

## 2023-03-29 RX ORDER — OXYCODONE HYDROCHLORIDE 5 MG/1
5 TABLET ORAL EVERY 4 HOURS
Refills: 0 | Status: DISCONTINUED | OUTPATIENT
Start: 2023-03-29 | End: 2023-03-30

## 2023-03-29 RX ORDER — SODIUM CHLORIDE 9 MG/ML
3 INJECTION INTRAMUSCULAR; INTRAVENOUS; SUBCUTANEOUS EVERY 8 HOURS
Refills: 0 | Status: DISCONTINUED | OUTPATIENT
Start: 2023-03-29 | End: 2023-04-03

## 2023-03-29 RX ORDER — IBUPROFEN 200 MG
1 TABLET ORAL
Qty: 0 | Refills: 0 | DISCHARGE

## 2023-03-29 RX ADMIN — SIMETHICONE 80 MILLIGRAM(S): 80 TABLET, CHEWABLE ORAL at 15:25

## 2023-03-29 RX ADMIN — Medication 15 MILLIGRAM(S): at 06:10

## 2023-03-29 RX ADMIN — Medication 1000 MILLIGRAM(S): at 09:35

## 2023-03-29 RX ADMIN — SIMETHICONE 80 MILLIGRAM(S): 80 TABLET, CHEWABLE ORAL at 21:06

## 2023-03-29 RX ADMIN — Medication 15 MILLIGRAM(S): at 00:23

## 2023-03-29 RX ADMIN — OXYCODONE HYDROCHLORIDE 5 MILLIGRAM(S): 5 TABLET ORAL at 12:25

## 2023-03-29 RX ADMIN — Medication 975 MILLIGRAM(S): at 21:06

## 2023-03-29 RX ADMIN — Medication 600 MILLIGRAM(S): at 23:49

## 2023-03-29 RX ADMIN — Medication 400 MILLIGRAM(S): at 09:05

## 2023-03-29 RX ADMIN — Medication 600 MILLIGRAM(S): at 13:00

## 2023-03-29 RX ADMIN — OXYCODONE HYDROCHLORIDE 5 MILLIGRAM(S): 5 TABLET ORAL at 23:48

## 2023-03-29 RX ADMIN — SIMETHICONE 80 MILLIGRAM(S): 80 TABLET, CHEWABLE ORAL at 09:05

## 2023-03-29 RX ADMIN — HEPARIN SODIUM 5000 UNIT(S): 5000 INJECTION INTRAVENOUS; SUBCUTANEOUS at 06:10

## 2023-03-29 RX ADMIN — HYDROMORPHONE HYDROCHLORIDE 30 MILLILITER(S): 2 INJECTION INTRAMUSCULAR; INTRAVENOUS; SUBCUTANEOUS at 07:13

## 2023-03-29 RX ADMIN — Medication 15 MILLIGRAM(S): at 06:35

## 2023-03-29 RX ADMIN — Medication 975 MILLIGRAM(S): at 21:40

## 2023-03-29 RX ADMIN — SENNA PLUS 2 TABLET(S): 8.6 TABLET ORAL at 22:38

## 2023-03-29 RX ADMIN — SODIUM CHLORIDE 3 MILLILITER(S): 9 INJECTION INTRAMUSCULAR; INTRAVENOUS; SUBCUTANEOUS at 21:45

## 2023-03-29 RX ADMIN — Medication 400 MILLIGRAM(S): at 02:37

## 2023-03-29 RX ADMIN — HEPARIN SODIUM 5000 UNIT(S): 5000 INJECTION INTRAVENOUS; SUBCUTANEOUS at 18:41

## 2023-03-29 RX ADMIN — Medication 1000 MILLIGRAM(S): at 03:10

## 2023-03-29 RX ADMIN — Medication 600 MILLIGRAM(S): at 19:12

## 2023-03-29 RX ADMIN — Medication 975 MILLIGRAM(S): at 16:00

## 2023-03-29 RX ADMIN — SIMETHICONE 80 MILLIGRAM(S): 80 TABLET, CHEWABLE ORAL at 02:37

## 2023-03-29 RX ADMIN — OXYCODONE HYDROCHLORIDE 5 MILLIGRAM(S): 5 TABLET ORAL at 17:59

## 2023-03-29 RX ADMIN — Medication 975 MILLIGRAM(S): at 15:26

## 2023-03-29 RX ADMIN — Medication 600 MILLIGRAM(S): at 12:25

## 2023-03-29 RX ADMIN — Medication 600 MILLIGRAM(S): at 18:41

## 2023-03-29 NOTE — DISCHARGE NOTE PROVIDER - NSDCCPCAREPLAN_GEN_ALL_CORE_FT
PRINCIPAL DISCHARGE DIAGNOSIS  Diagnosis: Leiomyoma of uterus  Assessment and Plan of Treatment:

## 2023-03-29 NOTE — DISCHARGE NOTE PROVIDER - CARE PROVIDERS DIRECT ADDRESSES
[email or web address]

## 2023-03-29 NOTE — PROGRESS NOTE ADULT - SUBJECTIVE AND OBJECTIVE BOX
R1 GYN Progress Note    POD#1   HD#2    Patient seen and examined at bedside.  No acute events overnight. No acute complaints. Pain well controlled with PCA pump. Denies heavy vaginal bleeding overnight.  Patient is ambulating and tolerating sips of tea and water. Pt has not eaten since surgery.  Has not yet passed flatus.    Leigh is still in place.   Denies CP, SOB, N/V, fevers, and chills.    Vital Signs Last 24 Hours  T(C): 36.7 (03-29-23 @ 05:47), Max: 36.9 (03-29-23 @ 01:04)  HR: 86 (03-29-23 @ 05:47) (78 - 97)  BP: 109/63 (03-29-23 @ 05:47) (109/63 - 149/86)  RR: 18 (03-29-23 @ 05:47) (14 - 19)  SpO2: 97% (03-29-23 @ 05:47) (96% - 100%)    I&O's Summary    28 Mar 2023 07:01  -  29 Mar 2023 06:48  --------------------------------------------------------  IN: 500 mL / OUT: 3280 mL / NET: -2780 mL        Physical Exam:  General: NAD  CV: RR  Lungs: CTA b/l  Abdomen: Soft, mildly tender, moderately distended, tympanic, normoactive bowel sounds  Incision: midline vertical CDI with pressure dressing in place  : pad approx 50% saturated from overnight  Ext: No pain or swelling in lower extremities bilaterally     Labs:                        8.5    14.55 )-----------( 324      ( 28 Mar 2023 14:23 )             29.2   baso 0.3    eos 0.0    imm gran 0.8    lymph 8.2    mono 3.0    poly 87.7       MEDICATIONS  (STANDING):  acetaminophen   IVPB .. 1000 milliGRAM(s) IV Intermittent once  cefoTEtan  IVPB 2 Gram(s) IV Intermittent once  chlorhexidine 2% Cloths 1 Application(s) Topical once  heparin   Injectable 5000 Unit(s) SubCutaneous every 12 hours  HYDROmorphone PCA (1 mG/mL) 30 milliLiter(s) PCA Continuous PCA Continuous  ketorolac   Injectable 15 milliGRAM(s) IV Push every 6 hours  lactated ringers. 1000 milliLiter(s) (125 mL/Hr) IV Continuous <Continuous>  lidocaine 1% Injectable 0.2 milliLiter(s) Local Injection once    MEDICATIONS  (PRN):  HYDROmorphone PCA (1 mG/mL) Rescue Clinician Bolus 0.5 milliGRAM(s) IV Push every 15 minutes PRN for Pain Scale GREATER THAN 6  naloxone Injectable 0.1 milliGRAM(s) IV Push every 3 minutes PRN For ANY of the following changes in patient status:  A. RR LESS THAN 10 breaths per minute, B. Oxygen saturation LESS THAN 90%, C. Sedation score of 6  ondansetron Injectable 4 milliGRAM(s) IV Push every 6 hours PRN Nausea  simethicone 80 milliGRAM(s) Chew every 6 hours PRN Gas R1 GYN Progress Note    POD#1   HD#2    Patient seen and examined at bedside.  No acute events overnight. Pt reports some dizziness the first time she stood up which has since improved. Pain well controlled with PCA pump. Denies heavy vaginal bleeding overnight.  Patient is ambulating and tolerating sips of tea and water. Pt has not eaten since surgery.  Has not yet passed flatus.    Leigh is still in place.   Denies CP, SOB, N/V, fevers, and chills.    Vital Signs Last 24 Hours  T(C): 36.7 (03-29-23 @ 05:47), Max: 36.9 (03-29-23 @ 01:04)  HR: 86 (03-29-23 @ 05:47) (78 - 97)  BP: 109/63 (03-29-23 @ 05:47) (109/63 - 149/86)  RR: 18 (03-29-23 @ 05:47) (14 - 19)  SpO2: 97% (03-29-23 @ 05:47) (96% - 100%)    I&O's Summary    28 Mar 2023 07:01  -  29 Mar 2023 06:48  --------------------------------------------------------  IN: 500 mL / OUT: 3280 mL / NET: -2780 mL        Physical Exam:  General: NAD  CV: RR  Lungs: CTA b/l  Abdomen: Soft, mildly tender, moderately distended, tympanic, normoactive bowel sounds  Incision: midline vertical CDI with pressure dressing in place  : pad approx 50% saturated from overnight  Ext: No pain or swelling in lower extremities bilaterally     Labs:                        8.5    14.55 )-----------( 324      ( 28 Mar 2023 14:23 )             29.2   baso 0.3    eos 0.0    imm gran 0.8    lymph 8.2    mono 3.0    poly 87.7       MEDICATIONS  (STANDING):  acetaminophen   IVPB .. 1000 milliGRAM(s) IV Intermittent once  cefoTEtan  IVPB 2 Gram(s) IV Intermittent once  chlorhexidine 2% Cloths 1 Application(s) Topical once  heparin   Injectable 5000 Unit(s) SubCutaneous every 12 hours  HYDROmorphone PCA (1 mG/mL) 30 milliLiter(s) PCA Continuous PCA Continuous  ketorolac   Injectable 15 milliGRAM(s) IV Push every 6 hours  lactated ringers. 1000 milliLiter(s) (125 mL/Hr) IV Continuous <Continuous>  lidocaine 1% Injectable 0.2 milliLiter(s) Local Injection once    MEDICATIONS  (PRN):  HYDROmorphone PCA (1 mG/mL) Rescue Clinician Bolus 0.5 milliGRAM(s) IV Push every 15 minutes PRN for Pain Scale GREATER THAN 6  naloxone Injectable 0.1 milliGRAM(s) IV Push every 3 minutes PRN For ANY of the following changes in patient status:  A. RR LESS THAN 10 breaths per minute, B. Oxygen saturation LESS THAN 90%, C. Sedation score of 6  ondansetron Injectable 4 milliGRAM(s) IV Push every 6 hours PRN Nausea  simethicone 80 milliGRAM(s) Chew every 6 hours PRN Gas

## 2023-03-29 NOTE — PROGRESS NOTE ADULT - ASSESSMENT
A/P: 42y POD#1 s/p abdominal myomectomy with midline vertical incision, uncomplicated, . No acute events overnight. Patient is currently clinically stable and doing well.      Neuro: Pain well controlled with PCA pump, transition to PO pain meds today  CV: Hemodynamically stable, f/u AM CBC  Pulm: Saturating well on room air, encourage oob/amb, encourage use of incentive spirometry  GI: Continue regular diet  - Zofran, Simethicone PRN  : UOP adequate (2450cc overnight), d/c jin pending AM labs  Heme: c/w HSQ, ambulation and SCDs for DVT ppx  FEN: LR@125. replete electrolytes prn   ID: Afebrile  Endo: No active issues   Dispo: Continue routine post-op care    Diana Rowan PGY1

## 2023-03-29 NOTE — DISCHARGE NOTE PROVIDER - HOSPITAL COURSE
43 y/o s/p abdominal myomectomy with vertical midline incision.  EBL: 400. Hct:29.2  POD #0, pt was advanced to a regular diet. Pain was controlled with a PCA pump.  POD#1, Leigh catheter was removed and pt was able to void spontaneously. Pt was transitioned from PCA pump to PO pain medications.  POD#______, pt was discharged in stable condition, ambulating, tolerating po and voiding spontaneously. Pt to have close f/u w/ Dr. Garland in clinic. 43 y/o s/p abdominal myomectomy with vertical midline incision.  EBL: 400. Hct:29.2->26.0->23.2->26.0  POD #0, pt was advanced to a regular diet. Pain was controlled with a PCA pump.  POD#1, Leigh catheter was removed and pt was able to void spontaneously. Pt was transitioned from PCA pump to PO pain medications.  POD#2, pt reported palpitations. H/H 7.0/23.2, repeat 7.8/26.0. Hematology consulted for anemia, likely acute blood loss anemia 2/2 surgery. Iron studies revealed low total iron. Pt started on  IV Iron (3/30-).  POD#3, pt reported 3 episodes of emesis overnight, nausea improved with Zofran. Abdominal exam notable for moderate distention, however still tympanic and pt still  passing flatus. Pt was made NPO and was given IV fluids for presumed ileus, ambulation encouraged.  On POD#____, pt was resumed on a regular diet which she tolerated well.  On POD# ______, pt was discharged in stable condition, ambulating, tolerating po and voiding spontaneously. Pt to have close f/u w/ Dr. Garland in clinic. 41 y/o s/p abdominal myomectomy with vertical midline incision.  EBL: 400. Hct:29.2->26.0->23.2->26.0  POD #0, pt was advanced to a regular diet. Pain was controlled with a PCA pump.  POD#1, Leigh catheter was removed and pt was able to void spontaneously. Pt was transitioned from PCA pump to PO pain medications.  POD#2, pt reported palpitations. H/H 7.0/23.2, repeat 7.8/26.0. Hematology consulted for anemia, likely acute blood loss anemia 2/2 surgery. Iron studies revealed low total iron. Pt started on  IV Iron (3/30-).  POD#3, pt reported 3 episodes of emesis overnight, nausea improved with Zofran. Abdominal exam notable for moderate distention, however still tympanic and pt still  passing flatus. Pt was made NPO and was given IV fluids for presumed ileus, ambulation encouraged.   On POD#4SATURDAY, pt was resumed on a regular diet which she tolerated well.  On POD#5SUNDAY, pt was discharged in stable condition, ambulating, tolerating po and voiding spontaneously. Pt to have close f/u w/ Dr. Garland in clinic. 43 y/o s/p abdominal myomectomy with vertical midline incision.  EBL: 400. Hct:29.2->26.0->23.2->26.0  POD #0, pt was advanced to a regular diet. Pain was controlled with a PCA pump.  POD#1, Leigh catheter was removed and pt was able to void spontaneously. Pt was transitioned from PCA pump to PO pain medications.  POD#2, pt reported palpitations. H/H 7.0/23.2, repeat 7.8/26.0. Hematology consulted for anemia, likely acute blood loss anemia 2/2 surgery. Iron studies revealed low total iron. Pt started on  IV Iron (3/30-).  POD#3, pt reported 3 episodes of emesis overnight, nausea improved with Zofran. Abdominal exam notable for moderate distention, however still tympanic and pt still  passing flatus. Pt was made NPO and was given IV fluids for presumed ileus, ambulation encouraged.   On POD#4, the patient was kept NPO due to decreased bowel function. She was still passing gas.  On POD#5, she was advanced to clear liquid diet and subsequently regular diet. She tolerated it well.   On POD#6 the patient was tolerating regular diet, passing flatus, and had had bowel movements. She was meeting all post operative milestones.     On day of discharge, the pt was discharged in stable condition, ambulating, tolerating po and voiding spontaneously. Pt to have close f/u w/ Dr. Garland.

## 2023-03-29 NOTE — DISCHARGE NOTE PROVIDER - NSDCFUSCHEDAPPT_GEN_ALL_CORE_FT
Tiara Garland  [facility]  [address]  Scheduled Appointment: 04/10/2023

## 2023-03-29 NOTE — DISCHARGE NOTE PROVIDER - NSDCMRMEDTOKEN_GEN_ALL_CORE_FT
ferrous sulfate 324 mg (65 mg elemental iron) oral tablet: orally 2 times a day  ibuprofen 600 mg oral tablet: 1 tab(s) orally every 6 hours as needed for Pain  oxyCODONE 5 mg oral tablet: 1 tab(s) orally every 6 hours as needed for  severe pain For severe breakthrough pain as needed MDD: 20mg  Tylenol 325 mg oral tablet: 3 tab(s) orally every 6 hours as needed for Pain   ferrous sulfate 324 mg (65 mg elemental iron) oral tablet: orally 2 times a day  ibuprofen 600 mg oral tablet: 1 tab(s) orally every 6 hours as needed for Pain  oxyCODONE 5 mg oral tablet: 1 tab(s) orally every 6 hours as needed for  severe pain For severe breakthrough pain as needed MDD: 20mg  polyethylene glycol 3350 oral powder for reconstitution: 17 gram(s) orally once a day (at bedtime)  Tylenol 325 mg oral tablet: 3 tab(s) orally every 6 hours as needed for Pain  Tylenol Regular Strength 325 mg oral tablet: 3 tab(s) orally every 6 hours

## 2023-03-29 NOTE — CONSULT NOTE ADULT - PROBLEM SELECTOR RECOMMENDATION 2
patient with baseline iron deficiency anemia with additional anemia secondary to operative blood loss some dizziness with standing last night may be related to anesthesia and postop versus due to anemia.  If continues to be symptomatic, would consider transfusion

## 2023-03-29 NOTE — CONSULT NOTE ADULT - SUBJECTIVE AND OBJECTIVE BOX
history of present illness  patient is a 42-year-old  female who is well known to me with significant history of menometrorrhagia due to uterine fibroids causing severe and recurrent iron deficiency anemia. patient presented for elective abdominal myomectomy for treatment of her uterine fibroids.  brief operative note reviewed.  approximately 20 weeks gestational size uterus with numerous uterine fibroids removed.  cc no significant complications noted. patient states that overall she is feeling well penis adequately controlled felt somewhat lightheaded and dizzy when trying to stand up last night.  Has been in bed since then no nausea, vomiting, diarrhea.  No bowel movement or flatus.  Leigh catheter still in place    PAST MEDICAL & SURGICAL HISTORY:  Iron deficiency anemia      2019 novel coronavirus disease (COVID-19)      History of palpitations      Leiomyoma of uterus      Obesity (BMI 30.0-34.9)      H/O  section      Home Medications:  ferrous sulfate 324 mg (65 mg elemental iron) oral tablet: orally 2 times a day (28 Mar 2023 07:49)  ibuprofen 600 mg oral tablet: 1 tab(s) orally every 6 hours as needed for Pain (29 Mar 2023 07:24)  Tylenol 325 mg oral tablet: 3 tab(s) orally every 6 hours as needed for Pain (29 Mar 2023 07:24)    FAMILY HISTORY:  Family history of heart valve abnormality (Father)    Social History:  No drugs or tobacco.  Occasional alcohol    ·  PRE-OP DIAGNOSIS:  Uterine fibroid 28-Mar-2023 15:33:57  Harleen Pan.  ·  POST-OP DIAGNOSIS:  Uterine fibroid 28-Mar-2023 15:34:06  Harleen Pan.  ·  PROCEDURES:  Abdominal myomectomy 28-Mar-2023 15:33:48  Harleen Pan.      Operative Findings:  · Operative Findings	EUA: Mobile 20 week sized uterus. Adnexa not palpable bilaterally.   Laparotomy: Fibroid uterus. Normal appearing ovaries and tubes bilaterally. Hemorrhagic corpus luteal cyst noted on left ovary. Large lower uterine segment fibroid. Multiple intracavitary fibroids. Multiple posterior fibroids. Multiple pedunculated fundal fibroids. 39 fibroids removed in total. Vistaseal and intercede placed over the uterus anteriorly and posteriorly. Excellent hemostasis.    Specimens/Blood Loss/IV/Output/Protocol/VTE:   Specimens/Blood Loss/IV/Output/Protocol/VTE:  · Specimens	Fibroids  · Estimated Blood Loss	400 milliLiter(s)  · IV Infusions - Crystalloids (mL)	3150  · IV Infusions - Blood Products (unit/s)	129cc cell saver  · Urine Output (mL)	500 mL  · Antibiotic Protocol	Followed protocol  Ancef      Vital Signs Last 24 Hrs  T(C): 36.6 (29 Mar 2023 08:44), Max: 36.9 (29 Mar 2023 01:04)  T(F): 97.9 (29 Mar 2023 08:44), Max: 98.4 (29 Mar 2023 01:04)  HR: 97 (29 Mar 2023 08:44) (78 - 97)  BP: 142/80 (29 Mar 2023 08:44) (109/63 - 142/80)  BP(mean): 75 (28 Mar 2023 18:00) (75 - 103)  RR: 18 (29 Mar 2023 08:44) (14 - 19)  SpO2: 100% (29 Mar 2023 08:44) (96% - 100%)    Parameters below as of 29 Mar 2023 08:44  Patient On (Oxygen Delivery Method): room air        Daily     Daily     I&O's Detail    28 Mar 2023 07:01  -  29 Mar 2023 07:00  --------------------------------------------------------  IN:    Lactated Ringers: 500 mL  Total IN: 500 mL    OUT:    Indwelling Catheter - Urethral (mL): 3280 mL  Total OUT: 3280 mL    Total NET: -2780 mL          Daily     CAPILLARY BLOOD GLUCOSE          MEDICATIONS  (STANDING):  cefoTEtan  IVPB 2 Gram(s) IV Intermittent once  chlorhexidine 2% Cloths 1 Application(s) Topical once  heparin   Injectable 5000 Unit(s) SubCutaneous every 12 hours  lactated ringers. 1000 milliLiter(s) (125 mL/Hr) IV Continuous <Continuous>  lidocaine 1% Injectable 0.2 milliLiter(s) Local Injection once    MEDICATIONS  (PRN):  acetaminophen     Tablet .. 975 milliGRAM(s) Oral every 6 hours PRN Mild Pain (1 - 3)  ibuprofen  Tablet. 600 milliGRAM(s) Oral every 6 hours PRN Mild Pain (1 - 3)  oxyCODONE    IR 5 milliGRAM(s) Oral every 4 hours PRN Moderate Pain (4 - 6)  simethicone 80 milliGRAM(s) Chew every 6 hours PRN Gas      Labs:                          7.6    13.61 )-----------( 336      ( 29 Mar 2023 07:09 )             26.0                         8.5    14.55 )-----------( 324      ( 28 Mar 2023 14:23 )             29.2

## 2023-03-29 NOTE — PROGRESS NOTE ADULT - SUBJECTIVE AND OBJECTIVE BOX
Day 1 of Anesthesia Pain Management Service    SUBJECTIVE: I'm doing ok    Pain Scale Score:	[X] Refer to charted pain scores    THERAPY:    [ ] IV PCA Morphine		[ ] 5 mg/mL	[ ] 1 mg/mL  [X] IV PCA Hydromorphone	[ ] 5 mg/mL	[X] 1 mg/mL  [ ] IV PCA Fentanyl		[ ] 50 micrograms/mL    Demand dose: 0.2 mg     Lockout: 6 minutes   Continuous Rate: 0 mg/hr  4 Hour Limit: 4 mg    MEDICATIONS  (STANDING):  acetaminophen   IVPB .. 1000 milliGRAM(s) IV Intermittent once  cefoTEtan  IVPB 2 Gram(s) IV Intermittent once  chlorhexidine 2% Cloths 1 Application(s) Topical once  heparin   Injectable 5000 Unit(s) SubCutaneous every 12 hours  HYDROmorphone PCA (1 mG/mL) 30 milliLiter(s) PCA Continuous PCA Continuous  ketorolac   Injectable 15 milliGRAM(s) IV Push every 6 hours  lactated ringers. 1000 milliLiter(s) (125 mL/Hr) IV Continuous <Continuous>  lidocaine 1% Injectable 0.2 milliLiter(s) Local Injection once    MEDICATIONS  (PRN):  HYDROmorphone PCA (1 mG/mL) Rescue Clinician Bolus 0.5 milliGRAM(s) IV Push every 15 minutes PRN for Pain Scale GREATER THAN 6  naloxone Injectable 0.1 milliGRAM(s) IV Push every 3 minutes PRN For ANY of the following changes in patient status:  A. RR LESS THAN 10 breaths per minute, B. Oxygen saturation LESS THAN 90%, C. Sedation score of 6  ondansetron Injectable 4 milliGRAM(s) IV Push every 6 hours PRN Nausea  simethicone 80 milliGRAM(s) Chew every 6 hours PRN Gas      OBJECTIVE:    Sedation Score:	[ X] Alert 	[ ] Drowsy 	[ ] Arousable	[ ] Asleep	[ ] Unresponsive    Side Effects:	[X ] None	[ ] Nausea	[ ] Vomiting	[ ] Pruritus  		[ ] Other:    Vital Signs Last 24 Hrs  T(C): 36.6 (29 Mar 2023 08:44), Max: 36.9 (29 Mar 2023 01:04)  T(F): 97.9 (29 Mar 2023 08:44), Max: 98.4 (29 Mar 2023 01:04)  HR: 97 (29 Mar 2023 08:44) (78 - 97)  BP: 142/80 (29 Mar 2023 08:44) (109/63 - 142/80)  BP(mean): 75 (28 Mar 2023 18:00) (75 - 103)  RR: 18 (29 Mar 2023 08:44) (14 - 19)  SpO2: 100% (29 Mar 2023 08:44) (96% - 100%)    Parameters below as of 29 Mar 2023 08:44  Patient On (Oxygen Delivery Method): room air        ASSESSMENT/ PLAN    Therapy to  be:               [X] Continued   [ ] Discontinued   [ ] Changed to PRN Analgesics    Documentation and Verification of current medications:   [X] Done	[ ] Not done, not eligible    Comments: Endorsing good analgesia. Total PCA use 9mg/ 24 hours. Consider transition to prn analgesics  later today.

## 2023-03-29 NOTE — DISCHARGE NOTE PROVIDER - CARE PROVIDER_API CALL
Tiara Garland)  Obstetrics and Gynecology  [address] [place] [address]  Phone: [phone]  Fax: [phone]  Scheduled Appointment: 04/10/2023

## 2023-03-30 LAB
APTT BLD: 26.7 SEC — LOW (ref 27.5–35.5)
BASOPHILS # BLD AUTO: 0.04 K/UL — SIGNIFICANT CHANGE UP (ref 0–0.2)
BASOPHILS NFR BLD AUTO: 0.4 % — SIGNIFICANT CHANGE UP (ref 0–2)
EOSINOPHIL # BLD AUTO: 0.1 K/UL — SIGNIFICANT CHANGE UP (ref 0–0.5)
EOSINOPHIL NFR BLD AUTO: 0.9 % — SIGNIFICANT CHANGE UP (ref 0–6)
FIBRINOGEN PPP-MCNC: 556 MG/DL — HIGH (ref 200–445)
HAPTOGLOB SERPL-MCNC: 225 MG/DL — HIGH (ref 34–200)
HCT VFR BLD CALC: 23.2 % — LOW (ref 34.5–45)
HCT VFR BLD CALC: 26 % — LOW (ref 34.5–45)
HGB BLD-MCNC: 7 G/DL — CRITICAL LOW (ref 11.5–15.5)
HGB BLD-MCNC: 7.8 G/DL — LOW (ref 11.5–15.5)
IMM GRANULOCYTES NFR BLD AUTO: 0.5 % — SIGNIFICANT CHANGE UP (ref 0–0.9)
INR BLD: 1.14 RATIO — SIGNIFICANT CHANGE UP (ref 0.88–1.16)
LDH SERPL L TO P-CCNC: 456 U/L — HIGH (ref 50–242)
LYMPHOCYTES # BLD AUTO: 2.43 K/UL — SIGNIFICANT CHANGE UP (ref 1–3.3)
LYMPHOCYTES # BLD AUTO: 22 % — SIGNIFICANT CHANGE UP (ref 13–44)
MCHC RBC-ENTMCNC: 22.8 PG — LOW (ref 27–34)
MCHC RBC-ENTMCNC: 23.1 PG — LOW (ref 27–34)
MCHC RBC-ENTMCNC: 30 GM/DL — LOW (ref 32–36)
MCHC RBC-ENTMCNC: 30.2 GM/DL — LOW (ref 32–36)
MCV RBC AUTO: 75.6 FL — LOW (ref 80–100)
MCV RBC AUTO: 76.9 FL — LOW (ref 80–100)
MONOCYTES # BLD AUTO: 0.77 K/UL — SIGNIFICANT CHANGE UP (ref 0–0.9)
MONOCYTES NFR BLD AUTO: 7 % — SIGNIFICANT CHANGE UP (ref 2–14)
NEUTROPHILS # BLD AUTO: 7.65 K/UL — HIGH (ref 1.8–7.4)
NEUTROPHILS NFR BLD AUTO: 69.2 % — SIGNIFICANT CHANGE UP (ref 43–77)
NRBC # BLD: 0 /100 WBCS — SIGNIFICANT CHANGE UP (ref 0–0)
NRBC # BLD: 0 /100 WBCS — SIGNIFICANT CHANGE UP (ref 0–0)
PLATELET # BLD AUTO: 278 K/UL — SIGNIFICANT CHANGE UP (ref 150–400)
PLATELET # BLD AUTO: 327 K/UL — SIGNIFICANT CHANGE UP (ref 150–400)
PROTHROM AB SERPL-ACNC: 13.1 SEC — SIGNIFICANT CHANGE UP (ref 10.5–13.4)
RBC # BLD: 3.01 M/UL — LOW (ref 3.8–5.2)
RBC # BLD: 3.07 M/UL — LOW (ref 3.8–5.2)
RBC # BLD: 3.38 M/UL — LOW (ref 3.8–5.2)
RBC # FLD: 17.1 % — HIGH (ref 10.3–14.5)
RBC # FLD: 17.2 % — HIGH (ref 10.3–14.5)
RETICS #: 55.7 K/UL — SIGNIFICANT CHANGE UP (ref 25–125)
RETICS/RBC NFR: 1.9 % — SIGNIFICANT CHANGE UP (ref 0.5–2.5)
TRANSFERRIN SERPL-MCNC: 206 MG/DL — SIGNIFICANT CHANGE UP (ref 200–360)
WBC # BLD: 11.04 K/UL — HIGH (ref 3.8–10.5)
WBC # BLD: 9.61 K/UL — SIGNIFICANT CHANGE UP (ref 3.8–10.5)
WBC # FLD AUTO: 11.04 K/UL — HIGH (ref 3.8–10.5)
WBC # FLD AUTO: 9.61 K/UL — SIGNIFICANT CHANGE UP (ref 3.8–10.5)

## 2023-03-30 PROCEDURE — 99231 SBSQ HOSP IP/OBS SF/LOW 25: CPT

## 2023-03-30 RX ORDER — TRAMADOL HYDROCHLORIDE 50 MG/1
50 TABLET ORAL EVERY 6 HOURS
Refills: 0 | Status: DISCONTINUED | OUTPATIENT
Start: 2023-03-30 | End: 2023-03-30

## 2023-03-30 RX ORDER — FERROUS SULFATE 325(65) MG
325 TABLET ORAL DAILY
Refills: 0 | Status: DISCONTINUED | OUTPATIENT
Start: 2023-03-30 | End: 2023-04-01

## 2023-03-30 RX ORDER — IRON SUCROSE 20 MG/ML
200 INJECTION, SOLUTION INTRAVENOUS EVERY 24 HOURS
Refills: 0 | Status: DISCONTINUED | OUTPATIENT
Start: 2023-03-30 | End: 2023-03-30

## 2023-03-30 RX ORDER — IRON SUCROSE 20 MG/ML
200 INJECTION, SOLUTION INTRAVENOUS EVERY 24 HOURS
Refills: 0 | Status: COMPLETED | OUTPATIENT
Start: 2023-03-30 | End: 2023-03-31

## 2023-03-30 RX ORDER — TRAMADOL HYDROCHLORIDE 50 MG/1
50 TABLET ORAL EVERY 6 HOURS
Refills: 0 | Status: DISCONTINUED | OUTPATIENT
Start: 2023-03-30 | End: 2023-03-31

## 2023-03-30 RX ORDER — ASCORBIC ACID 60 MG
500 TABLET,CHEWABLE ORAL DAILY
Refills: 0 | Status: DISCONTINUED | OUTPATIENT
Start: 2023-03-30 | End: 2023-04-03

## 2023-03-30 RX ORDER — ONDANSETRON 8 MG/1
4 TABLET, FILM COATED ORAL ONCE
Refills: 0 | Status: COMPLETED | OUTPATIENT
Start: 2023-03-30 | End: 2023-03-30

## 2023-03-30 RX ADMIN — OXYCODONE HYDROCHLORIDE 5 MILLIGRAM(S): 5 TABLET ORAL at 03:57

## 2023-03-30 RX ADMIN — Medication 600 MILLIGRAM(S): at 20:21

## 2023-03-30 RX ADMIN — OXYCODONE HYDROCHLORIDE 5 MILLIGRAM(S): 5 TABLET ORAL at 00:20

## 2023-03-30 RX ADMIN — HEPARIN SODIUM 5000 UNIT(S): 5000 INJECTION INTRAVENOUS; SUBCUTANEOUS at 06:05

## 2023-03-30 RX ADMIN — Medication 600 MILLIGRAM(S): at 12:45

## 2023-03-30 RX ADMIN — Medication 975 MILLIGRAM(S): at 12:15

## 2023-03-30 RX ADMIN — Medication 600 MILLIGRAM(S): at 06:04

## 2023-03-30 RX ADMIN — TRAMADOL HYDROCHLORIDE 50 MILLIGRAM(S): 50 TABLET ORAL at 20:20

## 2023-03-30 RX ADMIN — Medication 600 MILLIGRAM(S): at 13:30

## 2023-03-30 RX ADMIN — Medication 975 MILLIGRAM(S): at 19:00

## 2023-03-30 RX ADMIN — Medication 600 MILLIGRAM(S): at 06:37

## 2023-03-30 RX ADMIN — Medication 975 MILLIGRAM(S): at 02:44

## 2023-03-30 RX ADMIN — Medication 325 MILLIGRAM(S): at 02:53

## 2023-03-30 RX ADMIN — SODIUM CHLORIDE 3 MILLILITER(S): 9 INJECTION INTRAMUSCULAR; INTRAVENOUS; SUBCUTANEOUS at 21:00

## 2023-03-30 RX ADMIN — ONDANSETRON 4 MILLIGRAM(S): 8 TABLET, FILM COATED ORAL at 09:49

## 2023-03-30 RX ADMIN — IRON SUCROSE 110 MILLIGRAM(S): 20 INJECTION, SOLUTION INTRAVENOUS at 18:01

## 2023-03-30 RX ADMIN — HEPARIN SODIUM 5000 UNIT(S): 5000 INJECTION INTRAVENOUS; SUBCUTANEOUS at 18:01

## 2023-03-30 RX ADMIN — TRAMADOL HYDROCHLORIDE 50 MILLIGRAM(S): 50 TABLET ORAL at 21:30

## 2023-03-30 RX ADMIN — OXYCODONE HYDROCHLORIDE 5 MILLIGRAM(S): 5 TABLET ORAL at 04:30

## 2023-03-30 RX ADMIN — SIMETHICONE 80 MILLIGRAM(S): 80 TABLET, CHEWABLE ORAL at 19:00

## 2023-03-30 RX ADMIN — Medication 975 MILLIGRAM(S): at 11:23

## 2023-03-30 RX ADMIN — SODIUM CHLORIDE 3 MILLILITER(S): 9 INJECTION INTRAMUSCULAR; INTRAVENOUS; SUBCUTANEOUS at 13:10

## 2023-03-30 RX ADMIN — SIMETHICONE 80 MILLIGRAM(S): 80 TABLET, CHEWABLE ORAL at 03:57

## 2023-03-30 RX ADMIN — Medication 600 MILLIGRAM(S): at 21:30

## 2023-03-30 RX ADMIN — Medication 500 MILLIGRAM(S): at 13:04

## 2023-03-30 RX ADMIN — Medication 975 MILLIGRAM(S): at 18:02

## 2023-03-30 RX ADMIN — Medication 600 MILLIGRAM(S): at 00:20

## 2023-03-30 RX ADMIN — SODIUM CHLORIDE 3 MILLILITER(S): 9 INJECTION INTRAMUSCULAR; INTRAVENOUS; SUBCUTANEOUS at 06:37

## 2023-03-30 RX ADMIN — Medication 975 MILLIGRAM(S): at 03:15

## 2023-03-30 NOTE — PROGRESS NOTE ADULT - ASSESSMENT
A/P: 42y POD#2 s/p abdominal myomectomy with midline vertical incision, uncomplicated, . Pt reporting palpitations and chest discomfort overnight, tachycardic to 108, likely 2/2 acute blood loss anemia after surgery. Pt afebrile and normotensive, currently clinically stable.    Neuro: Pain well controlled with Tylenol, Motrin, Oxycodone PRN  CV: Hemodynamically stable  - Hct 32.2->29.2->26.0->23.2, f/u repeat CBC at 10a  - Continue PO Fe/Vit C daily  Pulm: Saturating well on room air, encourage oob/amb, encourage use of incentive spirometry  GI: Continue regular diet  - Zofran, Simethicone PRN  : Voiding spontaneously  Heme: c/w HSQ, ambulation and SCDs for DVT ppx  FEN: SLIV. replete electrolytes prn   ID: Afebrile  Endo: No active issues   Dispo: Continue routine post-op care    Diana Rowan PGY1 A/P: 42y POD#2 s/p abdominal myomectomy with midline vertical incision, uncomplicated, . Pt reporting palpitations and chest discomfort overnight, tachycardic to 108, likely 2/2 acute blood loss anemia after surgery. Pt afebrile and normotensive, currently clinically stable.    Neuro: Pain well controlled with Tylenol, Motrin, Oxycodone PRN  CV: Hemodynamically stable  - Hct 32.2->29.2->26.0->23.2, f/u repeat CBC at 9a  - Continue PO Fe/Vit C daily  - Heme consult for anemia recs  Pulm: Saturating well on room air, encourage oob/amb, encourage use of incentive spirometry  GI: Continue regular diet  - Zofran, Simethicone PRN  : Voiding spontaneously  Heme: c/w HSQ, ambulation and SCDs for DVT ppx  FEN: SLIV. replete electrolytes prn   ID: Afebrile  Endo: No active issues   Dispo: Continue routine post-op care    Diana Rowan PGY1

## 2023-03-30 NOTE — CHART NOTE - NSCHARTNOTEFT_GEN_A_CORE
R2 GYN Eval Note    Patient evaluated at bedside following call from RN of continued tachycardia to 103 as well as patient anxiety regarding situation. Previously received call when tachycardic to 108 at request of patient that her medical team be informed. Vitals reviewed, BPs noted to be wnl with normal O2 saturation.    At bedside patient states that she notes some palpitations but denies shortness of breath, chest pain. States that she knows her body and that feels like because her hemoglobin was 7.6 yesterday AM that if she does not receive some form of iron supplementation that her heart rate will increase even more the next day, and that at baseline she sometimes receives iron infusions outpatient. Explained to patient that PO iron supplementation will likely not have immediate effect - patient continuing to request iron supplementation. Patient otherwise well-appearing; tolerating a regular diet, ambulating without difficulty, pain well-controlled.    Phys Exam  Gen: NAD, patient speaking in full sentences without difficulty  Cardio: tachycardic to 100 on palpation, regular rhythm  Pulm: lungs clear to auscultation bilaterally  Abd: soft, appropriately tender and distended with clean bandage over midline vertical    Vital Signs  T(C): 37.4 (30 Mar 2023 01:07), Max: 37.4 (30 Mar 2023 01:07)  T(F): 99.3 (30 Mar 2023 01:07), Max: 99.3 (30 Mar 2023 01:07)  HR: 103 (30 Mar 2023 02:20) (86 - 108)  BP: 120/79 (30 Mar 2023 01:07) (109/63 - 145/88)  RR: 18 (30 Mar 2023 01:07) (18 - 18)  SpO2: 97% (30 Mar 2023 01:07) (97% - 100%)    43 y/o s/p abdominal myomectomy () with complaint of tachycardia; otherwise well-appearing and meeting most post-op milestones.    - Repeat CBC w/ H/H of 7/23.2, previously 7.6/26 on 3/29 in AM  - Patient ordered for PO iron supplementation per request  - Continue to monitor     d/w PGY4 Adriana Licona  PGY-2

## 2023-03-30 NOTE — PROGRESS NOTE ADULT - SUBJECTIVE AND OBJECTIVE BOX
R1 GYN Progress Note    POD#2   HD#3    Patient seen and examined at bedside. No acute events overnight. Pt reports feeling palpitations overnight as well as some chest tightness. Pt reports mild dizziness yesterday when she ambulated, has not yet ambulated today. Pain well controlled.  Patient is ambulating and tolerating regular diet.  Patient is passing flatus.  Patient is voiding spontaneously.  Denies SOB, N/V, fevers, and chills.    Vital Signs Last 24 Hours  T(C): 37.3 (03-30-23 @ 05:42), Max: 37.4 (03-30-23 @ 01:07)  HR: 93 (03-30-23 @ 05:42) (93 - 108)  BP: 134/85 (03-30-23 @ 05:42) (120/79 - 145/88)  RR: 18 (03-30-23 @ 05:42) (18 - 18)  SpO2: 96% (03-30-23 @ 05:42) (96% - 100%)    I&O's Summary    28 Mar 2023 07:01  -  29 Mar 2023 07:00  --------------------------------------------------------  IN: 500 mL / OUT: 3280 mL / NET: -2780 mL    29 Mar 2023 07:01  -  30 Mar 2023 06:38  --------------------------------------------------------  IN: 240 mL / OUT: 1600 mL / NET: -1360 mL    Physical Exam:  General: NAD  CV: RR  Lungs: CTA b/l  Abdomen: Soft, mildly tender, mildly distended, tympanic, normoactive bowel sounds  Incision: midline vertical c/d/i with steri strips in place  : no bleeding on pad  Ext: No pain or swelling in lower extremities bilaterally     Labs:                        7.0    9.61  )-----------( 278      ( 30 Mar 2023 03:06 )             23.2   baso x      eos x      imm gran x      lymph x      mono x      poly x                            7.6    13.61 )-----------( 336      ( 29 Mar 2023 07:09 )             26.0   baso 0.1    eos 0.0    imm gran 0.4    lymph 11.5   mono 9.0    poly 79.0                         8.5    14.55 )-----------( 324      ( 28 Mar 2023 14:23 )             29.2   baso 0.3    eos 0.0    imm gran 0.8    lymph 8.2    mono 3.0    poly 87.7       MEDICATIONS  (STANDING):  cefoTEtan  IVPB 2 Gram(s) IV Intermittent once  chlorhexidine 2% Cloths 1 Application(s) Topical once  ferrous    sulfate 325 milliGRAM(s) Oral daily  heparin   Injectable 5000 Unit(s) SubCutaneous every 12 hours  lidocaine 1% Injectable 0.2 milliLiter(s) Local Injection once  senna 2 Tablet(s) Oral at bedtime  sodium chloride 0.9% lock flush 3 milliLiter(s) IV Push every 8 hours    MEDICATIONS  (PRN):  acetaminophen     Tablet .. 975 milliGRAM(s) Oral every 6 hours PRN Mild Pain (1 - 3)  ibuprofen  Tablet. 600 milliGRAM(s) Oral every 6 hours PRN Mild Pain (1 - 3)  oxyCODONE    IR 5 milliGRAM(s) Oral every 4 hours PRN Moderate Pain (4 - 6)  simethicone 80 milliGRAM(s) Chew every 6 hours PRN Gas

## 2023-03-30 NOTE — PROGRESS NOTE ADULT - SUBJECTIVE AND OBJECTIVE BOX
Subjective: some palpitations overnight and some dizziness vertigo when standing yesterday but was able to walk around hallway yesterday without sob,cp or palpitations. has not been oob yet today.  jin out voiding ok. positive flatus but no bm.     Exam:  Gen: AA NAD  Neck: no JVD  Chest: normal shape and expansion  Heart: RRR s M  Lungs: CTAB  Abdomen: Soft, NT, ND, no HSM, normoactive BS x 4  Ext: no CCE  Skin: no rash        Vital Signs Last 24 Hrs  T(C): 37.3 (30 Mar 2023 05:42), Max: 37.4 (30 Mar 2023 01:07)  T(F): 99.1 (30 Mar 2023 05:42), Max: 99.3 (30 Mar 2023 01:07)  HR: 93 (30 Mar 2023 05:42) (93 - 108)  BP: 134/85 (30 Mar 2023 05:42) (120/79 - 145/88)  BP(mean): --  RR: 18 (30 Mar 2023 05:42) (18 - 18)  SpO2: 96% (30 Mar 2023 05:42) (96% - 100%)    Parameters below as of 30 Mar 2023 05:42  Patient On (Oxygen Delivery Method): room air        Daily     Daily     I&O's Detail    29 Mar 2023 07:01  -  30 Mar 2023 07:00  --------------------------------------------------------  IN:    Oral Fluid: 240 mL  Total IN: 240 mL    OUT:    Indwelling Catheter - Urethral (mL): 1100 mL    Voided (mL): 500 mL  Total OUT: 1600 mL    Total NET: -1360 mL          Daily     CAPILLARY BLOOD GLUCOSE          MEDICATIONS  (STANDING):  ascorbic acid 500 milliGRAM(s) Oral daily  cefoTEtan  IVPB 2 Gram(s) IV Intermittent once  chlorhexidine 2% Cloths 1 Application(s) Topical once  ferrous    sulfate 325 milliGRAM(s) Oral daily  heparin   Injectable 5000 Unit(s) SubCutaneous every 12 hours  lidocaine 1% Injectable 0.2 milliLiter(s) Local Injection once  senna 2 Tablet(s) Oral at bedtime  sodium chloride 0.9% lock flush 3 milliLiter(s) IV Push every 8 hours    MEDICATIONS  (PRN):  acetaminophen     Tablet .. 975 milliGRAM(s) Oral every 6 hours PRN Mild Pain (1 - 3)  ibuprofen  Tablet. 600 milliGRAM(s) Oral every 6 hours PRN Mild Pain (1 - 3)  oxyCODONE    IR 5 milliGRAM(s) Oral every 4 hours PRN Moderate Pain (4 - 6)  simethicone 80 milliGRAM(s) Chew every 6 hours PRN Gas      Labs:                          7.0    9.61  )-----------( 278      ( 30 Mar 2023 03:06 )             23.2                         7.6    13.61 )-----------( 336      ( 29 Mar 2023 07:09 )             26.0                         8.5    14.55 )-----------( 324      ( 28 Mar 2023 14:23 )             29.2

## 2023-03-30 NOTE — CHART NOTE - NSCHARTNOTEFT_GEN_A_CORE
Hematology chart note    Ms. Martinez is a 42 yr old F with history of uterine fibroids, heavy monthly periods,  LMP 3/11/2023, iron deficiency  anemia with iron infusions ( last infusion in 5/2022),  intermittent palpitations likely due to anemia, who presents to [facility] for scheduled abdominal myomectomy ( vertical incision ) on 3/28/2023. Patient feeling well, denies fever, chills, no acute complaints. Had Covid 3/2020 and 12/2022, denies Covid complications. Vaccinated. Preop Covid PCR 3/25/2023 @ [facility]. Hematology chart note    Ms. Martinez is a 42 yr old F with history of uterine fibroids, heavy monthly periods,  LMP 3/11/2023, iron deficiency  anemia with iron infusions ( last infusion in 5/2022),  intermittent palpitations likely due to anemia, who presents to [facility] for scheduled abdominal myomectomy ( vertical incision ) on 3/28/2023. As per note, on admission, pt had no acute complaints.    Hematology consulted for anemia.   lab showing normal wbc/plt/Cr/LFTs/Tbil   #Anemia   -Hb 10.8 on 1/31/23; on admission 3/26/23  Hb 9.5   -anemia likely multifactorial including iron deficiency, bleeding during surgery.   -chcek B12, folate level, iron studies and ferritin level, haptoglobin, LDH, reticulocyte   -check coagulation panel including PT/PTT/fibrinogen   -continue with ferrous sulfate PO   -daily CBC; transfuse if Hb<7; active type and screen   -pt may need IV iron infusion, and may start with Venofer 200mg IV daily  x 5 days if no contraindications from primary team. Alternatively, IV iron infusion can be continued as outpt with her PCP or hematologist if discharge is planning since pt had iron infusion before as outpt.   -may refer pt to [facility] if pt is willing to follow with an hematologist there. Staff will call pt after discharge.     Please contact with hematology if having more questions       Len Hill PGY5   hem & onc fellow [phone] Hematology chart note    Ms. Martinez is a 42 yr old F with history of uterine fibroids, heavy monthly periods,  LMP 3/11/2023, iron deficiency  anemia with iron infusions ( last infusion in 5/2022),  intermittent palpitations likely due to anemia, who presents to [facility] for scheduled abdominal myomectomy ( vertical incision ) on 3/28/2023. As per note, on admission, pt had no acute complaints.    Hematology consulted for anemia.   lab showing normal wbc/plt/Cr/LFTs/Tbil   #Anemia   -Hb 10.8 on 1/31/23; on admission 3/26/23  Hb 9.5   -anemia likely multifactorial including iron deficiency, bleeding during surgery.   -chcek B12, folate level, iron studies and ferritin level, haptoglobin, LDH, reticulocyte   -check coagulation panel including PT/PTT/fibrinogen   -continue with ferrous sulfate PO   -daily CBC; transfuse if Hb<7; active type and screen   -peripheral blood smear reviewed by hematology team showing hypochromic microcytic RBC, no wbc dysplasia seen.   -pt need IV iron infusion, and may start with Venofer 200mg IV daily  x 5 days if no contraindications from primary team.    -may refer pt to [facility] if pt is willing to follow with an hematologist there. Staff will call pt after discharge.     Please contact with hematology if having more questions       Len Hill PGY5   hem & onc fellow [phone]

## 2023-03-31 DIAGNOSIS — R11.2 NAUSEA WITH VOMITING, UNSPECIFIED: ICD-10-CM

## 2023-03-31 LAB
FERRITIN SERPL-MCNC: 42 NG/ML — SIGNIFICANT CHANGE UP (ref 15–150)
FOLATE SERPL-MCNC: 13.5 NG/ML — SIGNIFICANT CHANGE UP
HCT VFR BLD CALC: 27.1 % — LOW (ref 34.5–45)
HGB BLD-MCNC: 8 G/DL — LOW (ref 11.5–15.5)
IRON SATN MFR SERPL: 13 UG/DL — LOW (ref 30–160)
IRON SATN MFR SERPL: 5 % — LOW (ref 14–50)
MCHC RBC-ENTMCNC: 22.3 PG — LOW (ref 27–34)
MCHC RBC-ENTMCNC: 29.5 GM/DL — LOW (ref 32–36)
MCV RBC AUTO: 75.5 FL — LOW (ref 80–100)
NRBC # BLD: 0 /100 WBCS — SIGNIFICANT CHANGE UP (ref 0–0)
PLATELET # BLD AUTO: 375 K/UL — SIGNIFICANT CHANGE UP (ref 150–400)
RBC # BLD: 3.59 M/UL — LOW (ref 3.8–5.2)
RBC # FLD: 17.2 % — HIGH (ref 10.3–14.5)
TIBC SERPL-MCNC: 263 UG/DL — SIGNIFICANT CHANGE UP (ref 220–430)
UIBC SERPL-MCNC: 249 UG/DL — SIGNIFICANT CHANGE UP (ref 110–370)
VIT B12 SERPL-MCNC: 491 PG/ML — SIGNIFICANT CHANGE UP (ref 232–1245)
WBC # BLD: 9.69 K/UL — SIGNIFICANT CHANGE UP (ref 3.8–10.5)
WBC # FLD AUTO: 9.69 K/UL — SIGNIFICANT CHANGE UP (ref 3.8–10.5)

## 2023-03-31 RX ORDER — ONDANSETRON 8 MG/1
4 TABLET, FILM COATED ORAL EVERY 6 HOURS
Refills: 0 | Status: DISCONTINUED | OUTPATIENT
Start: 2023-03-31 | End: 2023-04-03

## 2023-03-31 RX ORDER — SODIUM CHLORIDE 9 MG/ML
1000 INJECTION, SOLUTION INTRAVENOUS
Refills: 0 | Status: DISCONTINUED | OUTPATIENT
Start: 2023-03-31 | End: 2023-04-01

## 2023-03-31 RX ORDER — ONDANSETRON 8 MG/1
4 TABLET, FILM COATED ORAL EVERY 6 HOURS
Refills: 0 | Status: DISCONTINUED | OUTPATIENT
Start: 2023-03-31 | End: 2023-03-31

## 2023-03-31 RX ORDER — ACETAMINOPHEN 500 MG
1000 TABLET ORAL ONCE
Refills: 0 | Status: DISCONTINUED | OUTPATIENT
Start: 2023-03-31 | End: 2023-04-03

## 2023-03-31 RX ORDER — KETOROLAC TROMETHAMINE 30 MG/ML
30 SYRINGE (ML) INJECTION EVERY 6 HOURS
Refills: 0 | Status: DISCONTINUED | OUTPATIENT
Start: 2023-03-31 | End: 2023-04-03

## 2023-03-31 RX ADMIN — Medication 600 MILLIGRAM(S): at 03:45

## 2023-03-31 RX ADMIN — Medication 30 MILLIGRAM(S): at 21:37

## 2023-03-31 RX ADMIN — SENNA PLUS 2 TABLET(S): 8.6 TABLET ORAL at 01:10

## 2023-03-31 RX ADMIN — Medication 975 MILLIGRAM(S): at 02:00

## 2023-03-31 RX ADMIN — ONDANSETRON 4 MILLIGRAM(S): 8 TABLET, FILM COATED ORAL at 05:23

## 2023-03-31 RX ADMIN — Medication 30 MILLIGRAM(S): at 22:00

## 2023-03-31 RX ADMIN — Medication 975 MILLIGRAM(S): at 01:10

## 2023-03-31 RX ADMIN — ONDANSETRON 4 MILLIGRAM(S): 8 TABLET, FILM COATED ORAL at 13:19

## 2023-03-31 RX ADMIN — IRON SUCROSE 110 MILLIGRAM(S): 20 INJECTION, SOLUTION INTRAVENOUS at 17:29

## 2023-03-31 RX ADMIN — HEPARIN SODIUM 5000 UNIT(S): 5000 INJECTION INTRAVENOUS; SUBCUTANEOUS at 21:13

## 2023-03-31 RX ADMIN — SIMETHICONE 80 MILLIGRAM(S): 80 TABLET, CHEWABLE ORAL at 04:20

## 2023-03-31 RX ADMIN — HEPARIN SODIUM 5000 UNIT(S): 5000 INJECTION INTRAVENOUS; SUBCUTANEOUS at 11:58

## 2023-03-31 RX ADMIN — Medication 30 MILLIGRAM(S): at 11:58

## 2023-03-31 NOTE — PROGRESS NOTE ADULT - ASSESSMENT
A/P: 42y POD#3 s/p abdominal myomectomy with midline vertical incision, uncomplicated, . Pt afebrile and normotensive, currently clinically stable. Meeting all post operative milestones.     Neuro: Pain well controlled with Tylenol, Motrin, Oxycodone PRN  CV: Hemodynamically stable  - Hct 32.2->29.2->26.0->23.2->26.0, f/u AM CBC  - Continue PO Fe/Vit C daily  - Heme consult for anemia recs  Pulm: Saturating well on room air, encourage oob/amb, encourage use of incentive spirometry  GI: NPO  - Zofran, Simethicone PRN  : Voiding spontaneously  Heme: c/w HSQ, ambulation and SCDs for DVT ppx  FEN: LR@100. replete electrolytes prn   ID: Afebrile  Endo: No active issues   Dispo: Continue routine post-op care    Harleen Pan, PGY2 A/P: 42y POD#3 s/p abdominal myomectomy with midline vertical incision, uncomplicated, . Pt afebrile and normotensive, currently clinically stable. Increased nausea and vomiting overnight on POD2, now with concern for post operative ileus.     Neuro: Pain well controlled with Tylenol, Motrin, Oxycodone PRN  CV: Hemodynamically stable  - Hct 32.2->29.2->26.0->23.2->26.0, f/u AM CBC  - Continue IV Fe/Vit C daily  - Heme consult for anemia recs  Pulm: Saturating well on room air, encourage oob/amb, encourage use of incentive spirometry  GI: NPO  - Zofran, Simethicone PRN  : Voiding spontaneously  Heme: c/w HSQ, ambulation and SCDs for DVT ppx  FEN: LR@100. replete electrolytes prn   ID: Afebrile  Endo: No active issues   Dispo: Continue routine post-op care    Harleen Pan, PGY2

## 2023-03-31 NOTE — PROVIDER CONTACT NOTE (OTHER) - SITUATION
pt receive a stat cbc due to elevated HR. a critical was noted of 7 for hemoglobin
Pt vomited two hours earlier clear fluids. Pt vomited again this time it was bilious (bright green)

## 2023-03-31 NOTE — CHART NOTE - NSCHARTNOTEFT_GEN_A_CORE
R2 GYN Eval Note    Notified by RN of patient with green bilious vomiting, previously had episode of clear emesis about two hours prior.    At bedside, patient stating she felt well and that nausea/vomiting had resolved after episode. Now feels well. Endorses passing gas, denies abdominal pain at time of exam. States that she has not had much to eat during her hospital stay because it is not the food she normally eats. Had a bite of grilled cheese for lunch but did not like the rest of the food. Then had some fruit for dinner. States that she has been drinking fluids without difficulty. Around 11pm she had an episode of clear-colored emesis. About half an hour ago she had another episode in the toilet - light green with some chunks of food. Difficult to assess 2/2 to being in toilet water.    Phys Exam  Gen: NAD  Cardio: regular rate and rhythm  Pulm: lungs clear to ausculation bilaterally  Abd: soft, appropriately tender, bowel sounds present in all quadrants    Vital Signs  T(C): 36.6 (31 Mar 2023 00:53), Max: 37.4 (30 Mar 2023 20:20)  T(F): 97.9 (31 Mar 2023 00:53), Max: 99.3 (30 Mar 2023 20:20)  HR: 96 (31 Mar 2023 00:53) (85 - 103)  BP: 141/87 (31 Mar 2023 00:53) (125/83 - 143/89)  RR: 18 (31 Mar 2023 00:53) (18 - 18)  SpO2: 99% (31 Mar 2023 00:53) (96% - 100%)    A/P:  41 y/o s/p abdominal myomectomy () with complaint of nausea/vomiting. Well-appearing.    - Patient feeling well after nausea/vomiting episodes  - Zofran, continue to monitor.    d/w PGY4 Adriana Licona  PGY-2.

## 2023-03-31 NOTE — CHART NOTE - NSCHARTNOTEFT_GEN_A_CORE
PA Note:    Patient seen and re-evaluated at bedside. In short, pt is POD#3 s/p an uncomplicated abdominal myomectomy with midline vertical incision with an EBL of 400cc. Pt noted to have increased nausea and vomiting overnight and was made NPO this morning. Pt states she had another episode of vomiting this afternoon. She is passing flatus but still feels "gassy." Pt ambulating and moving from bed to chair. She currently denies nausea, SOB, dizziness, fevers or chills. Pain controlled.     Vital Signs Last 24 Hrs  T(C): 36.9 (31 Mar 2023 13:58), Max: 37.4 (30 Mar 2023 20:20)  T(F): 98.4 (31 Mar 2023 13:58), Max: 99.3 (30 Mar 2023 20:20)  HR: 107 (31 Mar 2023 13:58) (94 - 107)  BP: 122/86 (31 Mar 2023 13:58) (122/86 - 141/88)  BP(mean): --  RR: 18 (31 Mar 2023 13:58) (18 - 18)  SpO2: 99% (31 Mar 2023 13:58) (99% - 100%)    Parameters below as of 31 Mar 2023 13:58  Patient On (Oxygen Delivery Method): room air    Plan:  Continue to monitor symptoms. Pt to be kept NPO. Continue with IV fluids, IV antiemetics and IV pain medication.   Dr. Garland aware  Trixie Thompson PA-C PA Note:    Patient seen and re-evaluated at bedside. In short, pt is POD#3 s/p an uncomplicated abdominal myomectomy with midline vertical incision with an EBL of 400cc. Pt noted to have increased nausea and vomiting overnight and was made NPO this morning. Concern for postop ileus. Pt states she had another episode of vomiting this afternoon. She is passing flatus but still feels "gassy." Pt ambulating and moving from bed to chair. She currently denies nausea, SOB, dizziness, fevers or chills. Pain controlled.     Vital Signs Last 24 Hrs  T(C): 36.9 (31 Mar 2023 13:58), Max: 37.4 (30 Mar 2023 20:20)  T(F): 98.4 (31 Mar 2023 13:58), Max: 99.3 (30 Mar 2023 20:20)  HR: 107 (31 Mar 2023 13:58) (94 - 107)  BP: 122/86 (31 Mar 2023 13:58) (122/86 - 141/88)  BP(mean): --  RR: 18 (31 Mar 2023 13:58) (18 - 18)  SpO2: 99% (31 Mar 2023 13:58) (99% - 100%)    Parameters below as of 31 Mar 2023 13:58  Patient On (Oxygen Delivery Method): room air    Plan:  Continue to monitor symptoms. Pt to be kept NPO. Continue with IV fluids, IV antiemetics and IV pain medication.   Dr. Garland aware  Trixie Thompson PA-C

## 2023-03-31 NOTE — PROGRESS NOTE ADULT - SUBJECTIVE AND OBJECTIVE BOX
Subjective: some palpitations overnight and some dizziness vertigo when standing yesterday but was able to walk around hallway yesterday without sob,cp or palpitations. has not been oob yet today.  jin out voiding ok. positive flatus but no bm.     Exam:  Gen: AA NAD  Neck: no JVD  Chest: normal shape and expansion  Heart: RRR s M  Lungs: CTAB  Abdomen: Soft, NT, ND, no HSM, normoactive BS x 4  Ext: no CCE  Skin: no rash        Vital Signs Last 24 Hrs  T(C): 36.9 (31 Mar 2023 05:28), Max: 37.4 (30 Mar 2023 20:20)  T(F): 98.5 (31 Mar 2023 05:28), Max: 99.3 (30 Mar 2023 20:20)  HR: 98 (31 Mar 2023 05:28) (85 - 98)  BP: 141/88 (31 Mar 2023 05:28) (125/83 - 143/89)  BP(mean): --  RR: 18 (31 Mar 2023 05:28) (18 - 18)  SpO2: 99% (31 Mar 2023 05:28) (99% - 100%)    Parameters below as of 31 Mar 2023 05:28  Patient On (Oxygen Delivery Method): room air        Daily     Daily     I&O's Detail      Daily     CAPILLARY BLOOD GLUCOSE          MEDICATIONS  (STANDING):  ascorbic acid 500 milliGRAM(s) Oral daily  cefoTEtan  IVPB 2 Gram(s) IV Intermittent once  chlorhexidine 2% Cloths 1 Application(s) Topical once  ferrous    sulfate 325 milliGRAM(s) Oral daily  heparin   Injectable 5000 Unit(s) SubCutaneous every 12 hours  iron sucrose IVPB 200 milliGRAM(s) IV Intermittent every 24 hours  lactated ringers. 1000 milliLiter(s) (100 mL/Hr) IV Continuous <Continuous>  lidocaine 1% Injectable 0.2 milliLiter(s) Local Injection once  senna 2 Tablet(s) Oral at bedtime  sodium chloride 0.9% lock flush 3 milliLiter(s) IV Push every 8 hours    MEDICATIONS  (PRN):  acetaminophen     Tablet .. 975 milliGRAM(s) Oral every 6 hours PRN Mild Pain (1 - 3)  ibuprofen  Tablet. 600 milliGRAM(s) Oral every 6 hours PRN Mild Pain (1 - 3)  ondansetron Injectable 4 milliGRAM(s) IV Push every 6 hours PRN Nausea and/or Vomiting  simethicone 80 milliGRAM(s) Chew every 6 hours PRN Gas  traMADol 50 milliGRAM(s) Oral every 6 hours PRN Moderate Pain (4 - 6)      Labs:                          8.0    9.69  )-----------( 375      ( 31 Mar 2023 07:01 )             27.1                         7.8    11.04 )-----------( 327      ( 30 Mar 2023 09:21 )             26.0                         7.0    9.61  )-----------( 278      ( 30 Mar 2023 03:06 )             23.2           PT/INR - ( 30 Mar 2023 16:46 )   PT: 13.1 sec;   INR: 1.14 ratio         PTT - ( 30 Mar 2023 16:46 )  PTT:26.7 sec      Fibrinogen Clauss: 556: Effective date 11/8/22, due to methodology change (Clauss Fibrinogen),   the fibrinogen reference range has been changed. mg/dL (03.30.23 @ 16:46)   Transferrin, Serum: 206 mg/dL (03.30.23 @ 16:46)   Reticulocyte Count (03.30.23 @ 16:46)   RBC Count: 3.01 M/uL  Reticulocyte Percent: 1.9 %  Absolute Reticulocytes: 55.7 K/uL  Haptoglobin, Serum: 225 mg/dL (03.30.23 @ 16:46)   Lactate Dehydrogenase, Serum: 456 U/L (03.30.23 @ 16:46)   Vitamin B12, Serum: 491 pg/mL (03.30.23 @ 16:46)    Folate, Serum: 13.5 ng/mL (03.30.23 @ 16:46)   Ferritin, Serum: 42 ng/mL (03.30.23 @ 16:46)        Subjective: developed nausea and vomiting overnight. several episodes of emesis.  Patient states has not had flatus since nausea and vomiting started    Exam:  Gen: AA NAD  Neck: no JVD  Chest: normal shape and expansion  Heart: RRR s M  Lungs: CTAB  Abdomen: Soft, Mildly tender, moderately distended, tympanitic, no HSM, hypoactive bowel sounds  Ext: no CCE  Skin: no rash        Vital Signs Last 24 Hrs  T(C): 36.9 (31 Mar 2023 05:28), Max: 37.4 (30 Mar 2023 20:20)  T(F): 98.5 (31 Mar 2023 05:28), Max: 99.3 (30 Mar 2023 20:20)  HR: 98 (31 Mar 2023 05:28) (85 - 98)  BP: 141/88 (31 Mar 2023 05:28) (125/83 - 143/89)  BP(mean): --  RR: 18 (31 Mar 2023 05:28) (18 - 18)  SpO2: 99% (31 Mar 2023 05:28) (99% - 100%)    Parameters below as of 31 Mar 2023 05:28  Patient On (Oxygen Delivery Method): room air        Daily     Daily     I&O's Detail      Daily     CAPILLARY BLOOD GLUCOSE          MEDICATIONS  (STANDING):  ascorbic acid 500 milliGRAM(s) Oral daily  cefoTEtan  IVPB 2 Gram(s) IV Intermittent once  chlorhexidine 2% Cloths 1 Application(s) Topical once  ferrous    sulfate 325 milliGRAM(s) Oral daily  heparin   Injectable 5000 Unit(s) SubCutaneous every 12 hours  iron sucrose IVPB 200 milliGRAM(s) IV Intermittent every 24 hours  lactated ringers. 1000 milliLiter(s) (100 mL/Hr) IV Continuous <Continuous>  lidocaine 1% Injectable 0.2 milliLiter(s) Local Injection once  senna 2 Tablet(s) Oral at bedtime  sodium chloride 0.9% lock flush 3 milliLiter(s) IV Push every 8 hours    MEDICATIONS  (PRN):  acetaminophen     Tablet .. 975 milliGRAM(s) Oral every 6 hours PRN Mild Pain (1 - 3)  ibuprofen  Tablet. 600 milliGRAM(s) Oral every 6 hours PRN Mild Pain (1 - 3)  ondansetron Injectable 4 milliGRAM(s) IV Push every 6 hours PRN Nausea and/or Vomiting  simethicone 80 milliGRAM(s) Chew every 6 hours PRN Gas  traMADol 50 milliGRAM(s) Oral every 6 hours PRN Moderate Pain (4 - 6)      Labs:                          8.0    9.69  )-----------( 375      ( 31 Mar 2023 07:01 )             27.1                         7.8    11.04 )-----------( 327      ( 30 Mar 2023 09:21 )             26.0                         7.0    9.61  )-----------( 278      ( 30 Mar 2023 03:06 )             23.2           PT/INR - ( 30 Mar 2023 16:46 )   PT: 13.1 sec;   INR: 1.14 ratio         PTT - ( 30 Mar 2023 16:46 )  PTT:26.7 sec      Fibrinogen Clauss: 556: Effective date 11/8/22, due to methodology change (Clauss Fibrinogen),   the fibrinogen reference range has been changed. mg/dL (03.30.23 @ 16:46)   Transferrin, Serum: 206 mg/dL (03.30.23 @ 16:46)   Reticulocyte Count (03.30.23 @ 16:46)   RBC Count: 3.01 M/uL  Reticulocyte Percent: 1.9 %  Absolute Reticulocytes: 55.7 K/uL  Haptoglobin, Serum: 225 mg/dL (03.30.23 @ 16:46)   Lactate Dehydrogenase, Serum: 456 U/L (03.30.23 @ 16:46)   Vitamin B12, Serum: 491 pg/mL (03.30.23 @ 16:46)    Folate, Serum: 13.5 ng/mL (03.30.23 @ 16:46)   Ferritin, Serum: 42 ng/mL (03.30.23 @ 16:46)

## 2023-03-31 NOTE — PROGRESS NOTE ADULT - SUBJECTIVE AND OBJECTIVE BOX
R1 GYN Progress Note    POD#3   HD#4    Patient seen and examined at bedside. Patient had episode of vomiting x3. Made NPO. Pt feeling overall well. Dizziness improving. Pain well controlled.  Patient is ambulating.  Patient is passing flatus.  Patient is voiding spontaneously.  Denies SOB, N/V, fevers, and chills.    Vital Signs Last 24 Hours  T(C): 36.9 (03-31-23 @ 05:28), Max: 37.4 (03-30-23 @ 20:20)  HR: 98 (03-31-23 @ 05:28) (85 - 98)  BP: 141/88 (03-31-23 @ 05:28) (125/83 - 143/89)  RR: 18 (03-31-23 @ 05:28) (18 - 18)  SpO2: 99% (03-31-23 @ 05:28) (99% - 100%)    I&O's Summary    29 Mar 2023 07:01  -  30 Mar 2023 07:00  --------------------------------------------------------  IN: 240 mL / OUT: 1600 mL / NET: -1360 mL      Physical Exam:  General: NAD  CV: RR  Lungs: CTA b/l  Abdomen: Soft, mildly tender, mildly distended, tympanic, normoactive bowel sounds  Incision: midline vertical c/d/i with steri strips in place  : no bleeding on pad  Ext: No pain or swelling in lower extremities bilaterally       Labs:                        7.8    11.04 )-----------( 327      ( 30 Mar 2023 09:21 )             26.0   baso 0.4    eos 0.9    imm gran 0.5    lymph 22.0   mono 7.0    poly 69.2                         7.0    9.61  )-----------( 278      ( 30 Mar 2023 03:06 )             23.2   baso x      eos x      imm gran x      lymph x      mono x      poly x                            7.6    13.61 )-----------( 336      ( 29 Mar 2023 07:09 )             26.0   baso 0.1    eos 0.0    imm gran 0.4    lymph 11.5   mono 9.0    poly 79.0                         8.5    14.55 )-----------( 324      ( 28 Mar 2023 14:23 )             29.2   baso 0.3    eos 0.0    imm gran 0.8    lymph 8.2    mono 3.0    poly 87.7       MEDICATIONS  (STANDING):  ascorbic acid 500 milliGRAM(s) Oral daily  cefoTEtan  IVPB 2 Gram(s) IV Intermittent once  chlorhexidine 2% Cloths 1 Application(s) Topical once  ferrous    sulfate 325 milliGRAM(s) Oral daily  heparin   Injectable 5000 Unit(s) SubCutaneous every 12 hours  iron sucrose IVPB 200 milliGRAM(s) IV Intermittent every 24 hours  lactated ringers. 1000 milliLiter(s) (100 mL/Hr) IV Continuous <Continuous>  lidocaine 1% Injectable 0.2 milliLiter(s) Local Injection once  senna 2 Tablet(s) Oral at bedtime  sodium chloride 0.9% lock flush 3 milliLiter(s) IV Push every 8 hours    MEDICATIONS  (PRN):  acetaminophen     Tablet .. 975 milliGRAM(s) Oral every 6 hours PRN Mild Pain (1 - 3)  ibuprofen  Tablet. 600 milliGRAM(s) Oral every 6 hours PRN Mild Pain (1 - 3)  ondansetron Injectable 4 milliGRAM(s) IV Push every 6 hours PRN Nausea and/or Vomiting  simethicone 80 milliGRAM(s) Chew every 6 hours PRN Gas  traMADol 50 milliGRAM(s) Oral every 6 hours PRN Moderate Pain (4 - 6)   GYN Progress Note    POD#3   HD#4    Patient seen and examined at bedside. Patient had episode of vomiting x3. Made NPO. Pt feeling overall well. Dizziness improving. Pain well controlled.  Patient is ambulating.  Patient is passing flatus, however, states it is decreased than usual.   Patient is voiding spontaneously.  Denies SOB, N/V, fevers, and chills.    Vital Signs Last 24 Hours  T(C): 36.9 (03-31-23 @ 05:28), Max: 37.4 (03-30-23 @ 20:20)  HR: 98 (03-31-23 @ 05:28) (85 - 98)  BP: 141/88 (03-31-23 @ 05:28) (125/83 - 143/89)  RR: 18 (03-31-23 @ 05:28) (18 - 18)  SpO2: 99% (03-31-23 @ 05:28) (99% - 100%)    I&O's Summary    29 Mar 2023 07:01  -  30 Mar 2023 07:00  --------------------------------------------------------  IN: 240 mL / OUT: 1600 mL / NET: -1360 mL      Physical Exam:  General: NAD  CV: RR  Lungs: CTA b/l  Abdomen: Soft, mildly tender, mildly distended, normoactive bowel sounds  Incision: midline vertical c/d/i with steri strips in place  : no bleeding on pad  Ext: No pain or swelling in lower extremities bilaterally       Labs:                        7.8    11.04 )-----------( 327      ( 30 Mar 2023 09:21 )             26.0   baso 0.4    eos 0.9    imm gran 0.5    lymph 22.0   mono 7.0    poly 69.2                         7.0    9.61  )-----------( 278      ( 30 Mar 2023 03:06 )             23.2   baso x      eos x      imm gran x      lymph x      mono x      poly x                            7.6    13.61 )-----------( 336      ( 29 Mar 2023 07:09 )             26.0   baso 0.1    eos 0.0    imm gran 0.4    lymph 11.5   mono 9.0    poly 79.0                         8.5    14.55 )-----------( 324      ( 28 Mar 2023 14:23 )             29.2   baso 0.3    eos 0.0    imm gran 0.8    lymph 8.2    mono 3.0    poly 87.7       MEDICATIONS  (STANDING):  ascorbic acid 500 milliGRAM(s) Oral daily  cefoTEtan  IVPB 2 Gram(s) IV Intermittent once  chlorhexidine 2% Cloths 1 Application(s) Topical once  ferrous    sulfate 325 milliGRAM(s) Oral daily  heparin   Injectable 5000 Unit(s) SubCutaneous every 12 hours  iron sucrose IVPB 200 milliGRAM(s) IV Intermittent every 24 hours  lactated ringers. 1000 milliLiter(s) (100 mL/Hr) IV Continuous <Continuous>  lidocaine 1% Injectable 0.2 milliLiter(s) Local Injection once  senna 2 Tablet(s) Oral at bedtime  sodium chloride 0.9% lock flush 3 milliLiter(s) IV Push every 8 hours    MEDICATIONS  (PRN):  acetaminophen     Tablet .. 975 milliGRAM(s) Oral every 6 hours PRN Mild Pain (1 - 3)  ibuprofen  Tablet. 600 milliGRAM(s) Oral every 6 hours PRN Mild Pain (1 - 3)  ondansetron Injectable 4 milliGRAM(s) IV Push every 6 hours PRN Nausea and/or Vomiting  simethicone 80 milliGRAM(s) Chew every 6 hours PRN Gas  traMADol 50 milliGRAM(s) Oral every 6 hours PRN Moderate Pain (4 - 6)

## 2023-03-31 NOTE — PROVIDER CONTACT NOTE (OTHER) - ASSESSMENT
Pt BP elevated from discomfort of stomach bloating/pain. Emesis is bilious and a large amount. Pt passing gas but no bowel movements
, / 79, 97 % oxy, temp 99.3

## 2023-04-01 LAB
BASOPHILS # BLD AUTO: 0.02 K/UL — SIGNIFICANT CHANGE UP (ref 0–0.2)
BASOPHILS NFR BLD AUTO: 0.4 % — SIGNIFICANT CHANGE UP (ref 0–2)
EOSINOPHIL # BLD AUTO: 0.34 K/UL — SIGNIFICANT CHANGE UP (ref 0–0.5)
EOSINOPHIL NFR BLD AUTO: 6.6 % — HIGH (ref 0–6)
HCT VFR BLD CALC: 24.5 % — LOW (ref 34.5–45)
HGB BLD-MCNC: 7.3 G/DL — LOW (ref 11.5–15.5)
IMM GRANULOCYTES NFR BLD AUTO: 0.4 % — SIGNIFICANT CHANGE UP (ref 0–0.9)
LYMPHOCYTES # BLD AUTO: 2.07 K/UL — SIGNIFICANT CHANGE UP (ref 1–3.3)
LYMPHOCYTES # BLD AUTO: 40.2 % — SIGNIFICANT CHANGE UP (ref 13–44)
MCHC RBC-ENTMCNC: 22.5 PG — LOW (ref 27–34)
MCHC RBC-ENTMCNC: 29.8 GM/DL — LOW (ref 32–36)
MCV RBC AUTO: 75.6 FL — LOW (ref 80–100)
MONOCYTES # BLD AUTO: 0.63 K/UL — SIGNIFICANT CHANGE UP (ref 0–0.9)
MONOCYTES NFR BLD AUTO: 12.2 % — SIGNIFICANT CHANGE UP (ref 2–14)
NEUTROPHILS # BLD AUTO: 2.07 K/UL — SIGNIFICANT CHANGE UP (ref 1.8–7.4)
NEUTROPHILS NFR BLD AUTO: 40.2 % — LOW (ref 43–77)
NRBC # BLD: 2 /100 WBCS — HIGH (ref 0–0)
PLATELET # BLD AUTO: 362 K/UL — SIGNIFICANT CHANGE UP (ref 150–400)
RBC # BLD: 3.24 M/UL — LOW (ref 3.8–5.2)
RBC # FLD: 17.3 % — HIGH (ref 10.3–14.5)
WBC # BLD: 5.15 K/UL — SIGNIFICANT CHANGE UP (ref 3.8–10.5)
WBC # FLD AUTO: 5.15 K/UL — SIGNIFICANT CHANGE UP (ref 3.8–10.5)

## 2023-04-01 RX ORDER — POLYETHYLENE GLYCOL 3350 17 G/17G
17 POWDER, FOR SOLUTION ORAL AT BEDTIME
Refills: 0 | Status: DISCONTINUED | OUTPATIENT
Start: 2023-04-01 | End: 2023-04-03

## 2023-04-01 RX ORDER — IRON SUCROSE 20 MG/ML
200 INJECTION, SOLUTION INTRAVENOUS EVERY 24 HOURS
Refills: 0 | Status: DISCONTINUED | OUTPATIENT
Start: 2023-04-01 | End: 2023-04-03

## 2023-04-01 RX ORDER — SODIUM CHLORIDE 9 MG/ML
1000 INJECTION, SOLUTION INTRAVENOUS
Refills: 0 | Status: DISCONTINUED | OUTPATIENT
Start: 2023-04-01 | End: 2023-04-02

## 2023-04-01 RX ADMIN — Medication 30 MILLIGRAM(S): at 17:31

## 2023-04-01 RX ADMIN — HEPARIN SODIUM 5000 UNIT(S): 5000 INJECTION INTRAVENOUS; SUBCUTANEOUS at 09:02

## 2023-04-01 RX ADMIN — Medication 30 MILLIGRAM(S): at 04:00

## 2023-04-01 RX ADMIN — Medication 30 MILLIGRAM(S): at 03:06

## 2023-04-01 RX ADMIN — Medication 30 MILLIGRAM(S): at 12:15

## 2023-04-01 RX ADMIN — Medication 30 MILLIGRAM(S): at 11:30

## 2023-04-01 RX ADMIN — IRON SUCROSE 110 MILLIGRAM(S): 20 INJECTION, SOLUTION INTRAVENOUS at 17:14

## 2023-04-01 RX ADMIN — HEPARIN SODIUM 5000 UNIT(S): 5000 INJECTION INTRAVENOUS; SUBCUTANEOUS at 21:05

## 2023-04-01 RX ADMIN — Medication 30 MILLIGRAM(S): at 23:26

## 2023-04-01 NOTE — PROGRESS NOTE ADULT - ASSESSMENT
Assessment/Plan: 42y female POD#4  s/p  abdominal myomectomy with midline vertical incision, uncomplicated, . Patient with increased nausea and vomiting overnight on POD#2 with concern for post operative ileus. Patient has remained NPO overnight, now reports improvement in symptoms and slow return of bowel function, passing flatus.       Neuro: Pain well controlled with Tylenol, Motrin, Oxycodone PRN.   CV: Hemodynamically stable  - Hct 32.2->29.2->26.0->23.2->26.0->27.1.  - F/u AM CBC  - Continue IV Fe/Vit C daily  - Appreciate Heme recs: IV Venofer x5 days, transfuse for Hb <7, cont PO Fe sulfate.   Pulm: Saturating well on room air, encourage oob/amb, encourage use of incentive spirometry  GI: NPO ,consider advancing diet today.   - Zofran, Simethicone PRN  : Voiding spontaneously  Heme: c/w HSQ, ambulation and SCDs for DVT ppx  FEN: LR@100. Replete electrolytes prn   ID: Afebrile  Endo: No active issues     Dispo: Continue routine post-op care    Aydin,  PGY2 Assessment/Plan: 42y female POD#4  s/p  abdominal myomectomy with midline vertical incision, uncomplicated, . Patient with increased nausea and vomiting overnight on POD#2 with concern for post operative ileus. Patient has remained NPO overnight, now reports improvement in symptoms and slow return of bowel function, passing flatus.       Neuro: Pain well controlled with Tylenol, Motrin, Oxycodone PRN.   CV: Hemodynamically stable  - Hct 32.2->29.2->26.0->23.2->26.0->27.1.  - F/u AM CBC  - Continue IV Fe/Vit C daily  - Appreciate Heme recs: IV Venofer x5 days, transfuse for Hb <7, cont PO Fe sulfate.   Pulm: Saturating well on room air, encourage oob/amb, encourage use of incentive spirometry  GI: NPO ,consider advancing diet today.   - Zofran, Simethicone PRN  : Voiding spontaneously  Heme: c/w HSQ, ambulation and SCDs for DVT ppx  FEN: LR@100. Replete electrolytes prn   ID: Afebrile  Endo: No active issues     Dispo: Continue routine post-op care    Aydin,  PGY2

## 2023-04-01 NOTE — PROGRESS NOTE ADULT - SUBJECTIVE AND OBJECTIVE BOX
Subjective: feeling better today    Exam:  Gen: AA NAD  Neck: no JVD  Chest: normal shape and expansion  Heart: RRR s M  Lungs: CTAB  Abdomen: Soft, Mildly tender, mildly distended, tympanitic, no HSM, hypoactive bowel sounds  Ext: no CCE  Skin: no rash        Vital Signs Last 24 Hrs  T(C): 37.1 (01 Apr 2023 17:15), Max: 37.3 (31 Mar 2023 21:48)  T(F): 98.7 (01 Apr 2023 17:15), Max: 99.1 (31 Mar 2023 21:48)  HR: 98 (01 Apr 2023 17:15) (94 - 106)  BP: 121/79 (01 Apr 2023 17:15) (118/77 - 138/89)  BP(mean): --  RR: 18 (01 Apr 2023 17:15) (18 - 18)  SpO2: 99% (01 Apr 2023 17:15) (96% - 100%)    Parameters below as of 01 Apr 2023 13:41  Patient On (Oxygen Delivery Method): room air        Daily     Daily     I&O's Detail    31 Mar 2023 07:01  -  01 Apr 2023 07:00  --------------------------------------------------------  IN:    Lactated Ringers: 1100 mL  Total IN: 1100 mL    OUT:  Total OUT: 0 mL    Total NET: 1100 mL      01 Apr 2023 07:01  -  01 Apr 2023 17:32  --------------------------------------------------------  IN:    Lactated Ringers: 1000 mL  Total IN: 1000 mL    OUT:  Total OUT: 0 mL    Total NET: 1000 mL          Daily     CAPILLARY BLOOD GLUCOSE          MEDICATIONS  (STANDING):  acetaminophen   IVPB .. 1000 milliGRAM(s) IV Intermittent once  ascorbic acid 500 milliGRAM(s) Oral daily  cefoTEtan  IVPB 2 Gram(s) IV Intermittent once  chlorhexidine 2% Cloths 1 Application(s) Topical once  heparin   Injectable 5000 Unit(s) SubCutaneous every 12 hours  iron sucrose IVPB 200 milliGRAM(s) IV Intermittent every 24 hours  ketorolac   Injectable 30 milliGRAM(s) IV Push every 6 hours  lactated ringers. 1000 milliLiter(s) (100 mL/Hr) IV Continuous <Continuous>  lidocaine 1% Injectable 0.2 milliLiter(s) Local Injection once  senna 2 Tablet(s) Oral at bedtime  sodium chloride 0.9% lock flush 3 milliLiter(s) IV Push every 8 hours    MEDICATIONS  (PRN):  ondansetron Injectable 4 milliGRAM(s) IV Push every 6 hours PRN Nausea and/or Vomiting  simethicone 80 milliGRAM(s) Chew every 6 hours PRN Gas      Labs:                          7.3    5.15  )-----------( 362      ( 01 Apr 2023 06:43 )             24.5                         8.0    9.69  )-----------( 375      ( 31 Mar 2023 07:01 )             27.1                         7.8    11.04 )-----------( 327      ( 30 Mar 2023 09:21 )             26.0       PT/INR - ( 30 Mar 2023 16:46 )   PT: 13.1 sec;   INR: 1.14 ratio         PTT - ( 30 Mar 2023 16:46 )  PTT:26.7 sec      Fibrinogen Clauss: 556: Effective date 11/8/22, due to methodology change (Clauss Fibrinogen),   the fibrinogen reference range has been changed. mg/dL (03.30.23 @ 16:46)   Transferrin, Serum: 206 mg/dL (03.30.23 @ 16:46)   Reticulocyte Count (03.30.23 @ 16:46)   RBC Count: 3.01 M/uL  Reticulocyte Percent: 1.9 %  Absolute Reticulocytes: 55.7 K/uL  Haptoglobin, Serum: 225 mg/dL (03.30.23 @ 16:46)   Lactate Dehydrogenase, Serum: 456 U/L (03.30.23 @ 16:46)   Vitamin B12, Serum: 491 pg/mL (03.30.23 @ 16:46)    Folate, Serum: 13.5 ng/mL (03.30.23 @ 16:46)   Ferritin, Serum: 42 ng/mL (03.30.23 @ 16:46)

## 2023-04-01 NOTE — PROGRESS NOTE ADULT - SUBJECTIVE AND OBJECTIVE BOX
GYN Progress Note     POD #4     HD #5    Subjective:   Pt seen and examined at bedside. No events overnight, remained NPO.  She reports passing flatus, denies vomiting overnight.  Pain well controlled. Patient ambulating. Pt denies fever, chills, chest pain, SOB, nausea, vomiting, lightheadedness, dizziness.      Objective:  T(F): 98.4 (04-01-23 @ 05:36), Max: 99.1 (03-31-23 @ 21:48)  HR: 97 (04-01-23 @ 05:36) (97 - 116)  BP: 132/84 (04-01-23 @ 05:36) (122/86 - 137/84)  RR: 18 (04-01-23 @ 05:36) (18 - 18)  SpO2: 98% (04-01-23 @ 05:36) (96% - 100%)  Wt(kg): --    I&O's Summary    31 Mar 2023 07:01  -  01 Apr 2023 06:41  --------------------------------------------------------  IN: 1100 mL / OUT: 0 mL / NET: 1100 mL        MEDICATIONS  (STANDING):  acetaminophen   IVPB .. 1000 milliGRAM(s) IV Intermittent once  ascorbic acid 500 milliGRAM(s) Oral daily  cefoTEtan  IVPB 2 Gram(s) IV Intermittent once  chlorhexidine 2% Cloths 1 Application(s) Topical once  ferrous    sulfate 325 milliGRAM(s) Oral daily  heparin   Injectable 5000 Unit(s) SubCutaneous every 12 hours  ketorolac   Injectable 30 milliGRAM(s) IV Push every 6 hours  lactated ringers. 1000 milliLiter(s) (100 mL/Hr) IV Continuous <Continuous>  lidocaine 1% Injectable 0.2 milliLiter(s) Local Injection once  senna 2 Tablet(s) Oral at bedtime  sodium chloride 0.9% lock flush 3 milliLiter(s) IV Push every 8 hours    MEDICATIONS  (PRN):  ondansetron Injectable 4 milliGRAM(s) IV Push every 6 hours PRN Nausea and/or Vomiting  simethicone 80 milliGRAM(s) Chew every 6 hours PRN Gas      Physical Exam:  Constitutional: NAD, A+O x3  CV: RRR  Lungs: CTA bilaterally  Abdomen: softly distended, appropriately tender without guarding/rebound, hypoactive bowel sounds  Incision: midline vertical incision clean, dry, intact w/ overlying steri strips  Extremities: no lower extremity edema or calf tenderness bilaterally; venodynes in place.     LABS:                        8.0    9.69  )-----------( 375      ( 31 Mar 2023 07:01 )             27.1       PT/INR - ( 30 Mar 2023 16:46 )   PT: 13.1 sec;   INR: 1.14 ratio    PTT - ( 30 Mar 2023 16:46 )  PTT:26.7 sec

## 2023-04-02 LAB
ANION GAP SERPL CALC-SCNC: 14 MMOL/L — SIGNIFICANT CHANGE UP (ref 5–17)
BUN SERPL-MCNC: 4 MG/DL — LOW (ref 7–23)
CALCIUM SERPL-MCNC: 8.7 MG/DL — SIGNIFICANT CHANGE UP (ref 8.4–10.5)
CHLORIDE SERPL-SCNC: 101 MMOL/L — SIGNIFICANT CHANGE UP (ref 96–108)
CO2 SERPL-SCNC: 25 MMOL/L — SIGNIFICANT CHANGE UP (ref 22–31)
CREAT SERPL-MCNC: 0.54 MG/DL — SIGNIFICANT CHANGE UP (ref 0.5–1.3)
EGFR: 117 ML/MIN/1.73M2 — SIGNIFICANT CHANGE UP
GLUCOSE SERPL-MCNC: 84 MG/DL — SIGNIFICANT CHANGE UP (ref 70–99)
HCT VFR BLD CALC: 23.7 % — LOW (ref 34.5–45)
HGB BLD-MCNC: 6.8 G/DL — CRITICAL LOW (ref 11.5–15.5)
MCHC RBC-ENTMCNC: 22.4 PG — LOW (ref 27–34)
MCHC RBC-ENTMCNC: 28.7 GM/DL — LOW (ref 32–36)
MCV RBC AUTO: 78 FL — LOW (ref 80–100)
NRBC # BLD: 3 /100 WBCS — HIGH (ref 0–0)
PLATELET # BLD AUTO: 331 K/UL — SIGNIFICANT CHANGE UP (ref 150–400)
POTASSIUM SERPL-MCNC: 3.2 MMOL/L — LOW (ref 3.5–5.3)
POTASSIUM SERPL-SCNC: 3.2 MMOL/L — LOW (ref 3.5–5.3)
RBC # BLD: 3.04 M/UL — LOW (ref 3.8–5.2)
RBC # FLD: 18.1 % — HIGH (ref 10.3–14.5)
SODIUM SERPL-SCNC: 140 MMOL/L — SIGNIFICANT CHANGE UP (ref 135–145)
WBC # BLD: 6.78 K/UL — SIGNIFICANT CHANGE UP (ref 3.8–10.5)
WBC # FLD AUTO: 6.78 K/UL — SIGNIFICANT CHANGE UP (ref 3.8–10.5)

## 2023-04-02 RX ORDER — PSYLLIUM SEED (WITH DEXTROSE)
1 POWDER (GRAM) ORAL
Refills: 0 | Status: DISCONTINUED | OUTPATIENT
Start: 2023-04-02 | End: 2023-04-03

## 2023-04-02 RX ORDER — POTASSIUM CHLORIDE 20 MEQ
20 PACKET (EA) ORAL
Refills: 0 | Status: COMPLETED | OUTPATIENT
Start: 2023-04-02 | End: 2023-04-02

## 2023-04-02 RX ADMIN — POLYETHYLENE GLYCOL 3350 17 GRAM(S): 17 POWDER, FOR SOLUTION ORAL at 07:59

## 2023-04-02 RX ADMIN — Medication 500 MILLIGRAM(S): at 13:55

## 2023-04-02 RX ADMIN — HEPARIN SODIUM 5000 UNIT(S): 5000 INJECTION INTRAVENOUS; SUBCUTANEOUS at 20:45

## 2023-04-02 RX ADMIN — Medication 30 MILLIGRAM(S): at 00:00

## 2023-04-02 RX ADMIN — IRON SUCROSE 110 MILLIGRAM(S): 20 INJECTION, SOLUTION INTRAVENOUS at 17:05

## 2023-04-02 RX ADMIN — SODIUM CHLORIDE 3 MILLILITER(S): 9 INJECTION INTRAMUSCULAR; INTRAVENOUS; SUBCUTANEOUS at 06:17

## 2023-04-02 RX ADMIN — HEPARIN SODIUM 5000 UNIT(S): 5000 INJECTION INTRAVENOUS; SUBCUTANEOUS at 09:47

## 2023-04-02 RX ADMIN — Medication 20 MILLIEQUIVALENT(S): at 07:58

## 2023-04-02 RX ADMIN — Medication 30 MILLIGRAM(S): at 23:35

## 2023-04-02 RX ADMIN — Medication 30 MILLIGRAM(S): at 12:21

## 2023-04-02 RX ADMIN — Medication 30 MILLIGRAM(S): at 18:09

## 2023-04-02 RX ADMIN — Medication 30 MILLIGRAM(S): at 06:05

## 2023-04-02 RX ADMIN — Medication 30 MILLIGRAM(S): at 13:06

## 2023-04-02 RX ADMIN — SODIUM CHLORIDE 3 MILLILITER(S): 9 INJECTION INTRAMUSCULAR; INTRAVENOUS; SUBCUTANEOUS at 13:08

## 2023-04-02 RX ADMIN — Medication 30 MILLIGRAM(S): at 06:18

## 2023-04-02 RX ADMIN — SIMETHICONE 80 MILLIGRAM(S): 80 TABLET, CHEWABLE ORAL at 23:33

## 2023-04-02 RX ADMIN — Medication 20 MILLIEQUIVALENT(S): at 12:21

## 2023-04-02 RX ADMIN — Medication 20 MILLIEQUIVALENT(S): at 10:44

## 2023-04-02 RX ADMIN — SODIUM CHLORIDE 3 MILLILITER(S): 9 INJECTION INTRAMUSCULAR; INTRAVENOUS; SUBCUTANEOUS at 00:00

## 2023-04-02 RX ADMIN — Medication 30 MILLIGRAM(S): at 17:09

## 2023-04-02 NOTE — PROGRESS NOTE ADULT - SUBJECTIVE AND OBJECTIVE BOX
GYN Progress Note     POD #5     HD #6    Subjective:   Pt seen and examined at bedside. No events overnight. Patient was advanced to clears overnight but did not drink more than plain water as she felt constipated. She is still pasing flatus, denies nausea, vomiting. Pain well controlled. Patient ambulating. Pt denies fever, chills, chest pain, SOB, lightheadedness, dizziness.      Objective:  T(F): 98.8 (04-02-23 @ 05:24), Max: 98.8 (04-02-23 @ 05:24)  HR: 97 (04-02-23 @ 05:24) (94 - 100)  BP: 127/80 (04-02-23 @ 05:24) (118/77 - 144/86)  RR: 18 (04-02-23 @ 05:24) (18 - 18)  SpO2: 99% (04-02-23 @ 05:24) (97% - 100%)      I&O's Summary    01 Apr 2023 07:01  -  02 Apr 2023 07:00  --------------------------------------------------------  IN: 1000 mL / OUT: 0 mL / NET: 1000 mL      MEDICATIONS  (STANDING):  acetaminophen   IVPB .. 1000 milliGRAM(s) IV Intermittent once  ascorbic acid 500 milliGRAM(s) Oral daily  cefoTEtan  IVPB 2 Gram(s) IV Intermittent once  chlorhexidine 2% Cloths 1 Application(s) Topical once  heparin   Injectable 5000 Unit(s) SubCutaneous every 12 hours  iron sucrose IVPB 200 milliGRAM(s) IV Intermittent every 24 hours  ketorolac   Injectable 30 milliGRAM(s) IV Push every 6 hours  lactated ringers. 1000 milliLiter(s) (100 mL/Hr) IV Continuous <Continuous>  lidocaine 1% Injectable 0.2 milliLiter(s) Local Injection once  polyethylene glycol 3350 17 Gram(s) Oral at bedtime  psyllium Powder 1 Packet(s) Oral two times a day  sodium chloride 0.9% lock flush 3 milliLiter(s) IV Push every 8 hours    MEDICATIONS  (PRN):  ondansetron Injectable 4 milliGRAM(s) IV Push every 6 hours PRN Nausea and/or Vomiting  simethicone 80 milliGRAM(s) Chew every 6 hours PRN Gas      Physical Exam:  Constitutional: NAD, A+O x3  CV: RRR  Lungs: clear to auscultation bilaterally  Abdomen: soft, softly distended, appropriately tender with out guarding, no rebound, normal bowel sounds.   Incision: midline vertical incision  clean, dry, intact w/ overlying steri strips  Extremities: no lower extremity edema or calf tenderness bilaterally; pt declining to wear venodynes.     LABS:                        7.3    5.15  )-----------( 362      ( 01 Apr 2023 06:43 )             24.5

## 2023-04-02 NOTE — PROGRESS NOTE ADULT - SUBJECTIVE AND OBJECTIVE BOX
Subjective: feeling better today    Exam:  Gen: AA NAD  Neck: no JVD  Chest: normal shape and expansion  Heart: RRR s M  Lungs: CTAB  Abdomen: Soft, Mildly tender, mildly distended, tympanitic, no HSM, hypoactive bowel sounds  Ext: no CCE  Skin: no rash        Vital Signs Last 24 Hrs  T(C): 37.3 (02 Apr 2023 09:44), Max: 37.3 (02 Apr 2023 09:44)  T(F): 99.1 (02 Apr 2023 09:44), Max: 99.1 (02 Apr 2023 09:44)  HR: 99 (02 Apr 2023 09:44) (97 - 100)  BP: 133/86 (02 Apr 2023 09:44) (121/79 - 144/86)  BP(mean): --  RR: 18 (02 Apr 2023 09:44) (18 - 18)  SpO2: 100% (02 Apr 2023 09:44) (98% - 100%)    Parameters below as of 02 Apr 2023 09:44  Patient On (Oxygen Delivery Method): room air        Daily     Daily     I&O's Detail    01 Apr 2023 07:01  -  02 Apr 2023 07:00  --------------------------------------------------------  IN:    Lactated Ringers: 1000 mL  Total IN: 1000 mL    OUT:  Total OUT: 0 mL    Total NET: 1000 mL          Daily     CAPILLARY BLOOD GLUCOSE          MEDICATIONS  (STANDING):  acetaminophen   IVPB .. 1000 milliGRAM(s) IV Intermittent once  ascorbic acid 500 milliGRAM(s) Oral daily  cefoTEtan  IVPB 2 Gram(s) IV Intermittent once  chlorhexidine 2% Cloths 1 Application(s) Topical once  heparin   Injectable 5000 Unit(s) SubCutaneous every 12 hours  iron sucrose IVPB 200 milliGRAM(s) IV Intermittent every 24 hours  ketorolac   Injectable 30 milliGRAM(s) IV Push every 6 hours  lactated ringers. 1000 milliLiter(s) (100 mL/Hr) IV Continuous <Continuous>  lidocaine 1% Injectable 0.2 milliLiter(s) Local Injection once  polyethylene glycol 3350 17 Gram(s) Oral at bedtime  potassium chloride    Tablet ER 20 milliEquivalent(s) Oral every 2 hours  psyllium Powder 1 Packet(s) Oral two times a day  sodium chloride 0.9% lock flush 3 milliLiter(s) IV Push every 8 hours    MEDICATIONS  (PRN):  ondansetron Injectable 4 milliGRAM(s) IV Push every 6 hours PRN Nausea and/or Vomiting  simethicone 80 milliGRAM(s) Chew every 6 hours PRN Gas      Labs:                          6.8    6.78  )-----------( 331      ( 02 Apr 2023 06:42 )             23.7                         7.3    5.15  )-----------( 362      ( 01 Apr 2023 06:43 )             24.5                         8.0    9.69  )-----------( 375      ( 31 Mar 2023 07:01 )             27.1     04-02 @ 06:43    140  |  101  |  4<L>  ----------------------------<  84  3.2<L>   |  25  |  0.54      Ca    8.7      04-02 @ 06:43      PT/INR - ( 30 Mar 2023 16:46 )   PT: 13.1 sec;   INR: 1.14 ratio         PTT - ( 30 Mar 2023 16:46 )  PTT:26.7 sec      Fibrinogen Clauss: 556: Effective date 11/8/22, due to methodology change (Clauss Fibrinogen),   the fibrinogen reference range has been changed. mg/dL (03.30.23 @ 16:46)   Transferrin, Serum: 206 mg/dL (03.30.23 @ 16:46)   Reticulocyte Count (03.30.23 @ 16:46)   RBC Count: 3.01 M/uL  Reticulocyte Percent: 1.9 %  Absolute Reticulocytes: 55.7 K/uL  Haptoglobin, Serum: 225 mg/dL (03.30.23 @ 16:46)   Lactate Dehydrogenase, Serum: 456 U/L (03.30.23 @ 16:46)   Vitamin B12, Serum: 491 pg/mL (03.30.23 @ 16:46)    Folate, Serum: 13.5 ng/mL (03.30.23 @ 16:46)   Ferritin, Serum: 42 ng/mL (03.30.23 @ 16:46)

## 2023-04-02 NOTE — CHART NOTE - NSCHARTNOTEFT_GEN_A_CORE
Patient seen at bedside this afternoon.  She had 2 additional bowel movements today and feels much improved, denies feeling full or distended.  She tolerated broth as well as 2 jello containers and ginger ale.    She is passing gas, ambulating without issues. Denies dizziness, lightheadedness.     - Will advance to regular diet for dinner, encouraged patient to advance slowly with bread, plain foods, etc.    D/w Dr Dada Austin-Vargas PGY2 Patient seen at bedside this afternoon.  She had 2 additional bowel movements today and feels much improved, denies feeling full or distended.  She tolerated broth as well as 2 jello containers and ginger ale.    She is passing gas, ambulating without issues. Denies dizziness, lightheadedness.     - Will advance to regular diet for dinner, encouraged patient to advance slowly with bread, plain foods, etc.  - Patient to ambulate after dinner  - Pt previously refusing IV Venofer but now agreeing to 2 additional doses in setting of H/h  - AM CBC/BMP    D/w Dr Dada Austin-Vargas PGY2

## 2023-04-02 NOTE — PROGRESS NOTE ADULT - ASSESSMENT
Assessment/Plan: 42y female POD#5 s/p  abdominal myomectomy with midline vertical incision, uncomplicated, . Patient with increased nausea and vomiting overnight on POD#2 with concern for post operative ileus. Patient was advanced to clears overnight, now reports improvement in symptoms and slow return of bowel function, passing flatus, though complaining of constipation.  Neuro: Pain well controlled with Tylenol, Motrin, Oxycodone PRN.   CV: Hemodynamically stable  - Hct 32.2->29.2->26.0->23.2->26.0->27.1->f/u AM CBC  - Continue IV Fe/Vit C daily  - Appreciate Heme recs: IV Venofer x5 days, transfuse for Hb <7, cont PO Fe sulfate.   Pulm: Saturating well on room air, encourage oob/amb, encourage use of incentive spirometry  GI: CLD, will advance further when patient is tolerating more than water.    - Zofran, Simethicone PRN  - Metamucil, Colace PRN  : Voiding spontaneously  Heme: c/w HSQ, ambulation and SCDs for DVT ppx (pt refused SCDs overnight)  FEN: LR@100. Replete electrolytes PRN  ID: Afebrile  Endo: No active issues     Dispo: Continue routine post-op care      Dr Hernandez at bedside   Aydin,  PGY2

## 2023-04-03 ENCOUNTER — TRANSCRIPTION ENCOUNTER (OUTPATIENT)
Age: 43
End: 2023-04-03

## 2023-04-03 VITALS
DIASTOLIC BLOOD PRESSURE: 85 MMHG | OXYGEN SATURATION: 98 % | HEART RATE: 88 BPM | TEMPERATURE: 99 F | RESPIRATION RATE: 18 BRPM | SYSTOLIC BLOOD PRESSURE: 136 MMHG

## 2023-04-03 LAB
ANION GAP SERPL CALC-SCNC: 13 MMOL/L — SIGNIFICANT CHANGE UP (ref 5–17)
BLD GP AB SCN SERPL QL: NEGATIVE — SIGNIFICANT CHANGE UP
BUN SERPL-MCNC: <4 MG/DL — LOW (ref 7–23)
CALCIUM SERPL-MCNC: 9.2 MG/DL — SIGNIFICANT CHANGE UP (ref 8.4–10.5)
CHLORIDE SERPL-SCNC: 102 MMOL/L — SIGNIFICANT CHANGE UP (ref 96–108)
CO2 SERPL-SCNC: 24 MMOL/L — SIGNIFICANT CHANGE UP (ref 22–31)
CREAT SERPL-MCNC: 0.52 MG/DL — SIGNIFICANT CHANGE UP (ref 0.5–1.3)
EGFR: 118 ML/MIN/1.73M2 — SIGNIFICANT CHANGE UP
GLUCOSE SERPL-MCNC: 95 MG/DL — SIGNIFICANT CHANGE UP (ref 70–99)
HCT VFR BLD CALC: 27.1 % — LOW (ref 34.5–45)
HGB BLD-MCNC: 7.8 G/DL — LOW (ref 11.5–15.5)
MCHC RBC-ENTMCNC: 22.7 PG — LOW (ref 27–34)
MCHC RBC-ENTMCNC: 28.8 GM/DL — LOW (ref 32–36)
MCV RBC AUTO: 79 FL — LOW (ref 80–100)
NRBC # BLD: 2 /100 WBCS — HIGH (ref 0–0)
PLATELET # BLD AUTO: 395 K/UL — SIGNIFICANT CHANGE UP (ref 150–400)
POTASSIUM SERPL-MCNC: 3.7 MMOL/L — SIGNIFICANT CHANGE UP (ref 3.5–5.3)
POTASSIUM SERPL-SCNC: 3.7 MMOL/L — SIGNIFICANT CHANGE UP (ref 3.5–5.3)
RBC # BLD: 3.43 M/UL — LOW (ref 3.8–5.2)
RBC # FLD: 19.4 % — HIGH (ref 10.3–14.5)
RH IG SCN BLD-IMP: POSITIVE — SIGNIFICANT CHANGE UP
SODIUM SERPL-SCNC: 139 MMOL/L — SIGNIFICANT CHANGE UP (ref 135–145)
WBC # BLD: 9.35 K/UL — SIGNIFICANT CHANGE UP (ref 3.8–10.5)
WBC # FLD AUTO: 9.35 K/UL — SIGNIFICANT CHANGE UP (ref 3.8–10.5)

## 2023-04-03 PROCEDURE — 86901 BLOOD TYPING SEROLOGIC RH(D): CPT

## 2023-04-03 PROCEDURE — 85014 HEMATOCRIT: CPT

## 2023-04-03 PROCEDURE — 83010 ASSAY OF HAPTOGLOBIN QUANT: CPT

## 2023-04-03 PROCEDURE — 82607 VITAMIN B-12: CPT

## 2023-04-03 PROCEDURE — 84466 ASSAY OF TRANSFERRIN: CPT

## 2023-04-03 PROCEDURE — 83550 IRON BINDING TEST: CPT

## 2023-04-03 PROCEDURE — 82803 BLOOD GASES ANY COMBINATION: CPT

## 2023-04-03 PROCEDURE — 82330 ASSAY OF CALCIUM: CPT

## 2023-04-03 PROCEDURE — C1889: CPT

## 2023-04-03 PROCEDURE — 86900 BLOOD TYPING SEROLOGIC ABO: CPT

## 2023-04-03 PROCEDURE — 81025 URINE PREGNANCY TEST: CPT

## 2023-04-03 PROCEDURE — 36415 COLL VENOUS BLD VENIPUNCTURE: CPT

## 2023-04-03 PROCEDURE — C9399: CPT

## 2023-04-03 PROCEDURE — 83615 LACTATE (LD) (LDH) ENZYME: CPT

## 2023-04-03 PROCEDURE — 80048 BASIC METABOLIC PNL TOTAL CA: CPT

## 2023-04-03 PROCEDURE — 85018 HEMOGLOBIN: CPT

## 2023-04-03 PROCEDURE — 82565 ASSAY OF CREATININE: CPT

## 2023-04-03 PROCEDURE — 84295 ASSAY OF SERUM SODIUM: CPT

## 2023-04-03 PROCEDURE — 83605 ASSAY OF LACTIC ACID: CPT

## 2023-04-03 PROCEDURE — 86850 RBC ANTIBODY SCREEN: CPT

## 2023-04-03 PROCEDURE — 85730 THROMBOPLASTIN TIME PARTIAL: CPT

## 2023-04-03 PROCEDURE — 83540 ASSAY OF IRON: CPT

## 2023-04-03 PROCEDURE — 84132 ASSAY OF SERUM POTASSIUM: CPT

## 2023-04-03 PROCEDURE — 82728 ASSAY OF FERRITIN: CPT

## 2023-04-03 PROCEDURE — 85610 PROTHROMBIN TIME: CPT

## 2023-04-03 PROCEDURE — 85045 AUTOMATED RETICULOCYTE COUNT: CPT

## 2023-04-03 PROCEDURE — 88305 TISSUE EXAM BY PATHOLOGIST: CPT

## 2023-04-03 PROCEDURE — C1765: CPT

## 2023-04-03 PROCEDURE — 82746 ASSAY OF FOLIC ACID SERUM: CPT

## 2023-04-03 PROCEDURE — 82947 ASSAY GLUCOSE BLOOD QUANT: CPT

## 2023-04-03 PROCEDURE — 85384 FIBRINOGEN ACTIVITY: CPT

## 2023-04-03 PROCEDURE — 82435 ASSAY OF BLOOD CHLORIDE: CPT

## 2023-04-03 PROCEDURE — 85025 COMPLETE CBC W/AUTO DIFF WBC: CPT

## 2023-04-03 PROCEDURE — 85027 COMPLETE CBC AUTOMATED: CPT

## 2023-04-03 RX ORDER — ACETAMINOPHEN 500 MG
975 TABLET ORAL EVERY 6 HOURS
Refills: 0 | Status: DISCONTINUED | OUTPATIENT
Start: 2023-04-03 | End: 2023-04-03

## 2023-04-03 RX ORDER — POLYETHYLENE GLYCOL 3350 17 G/17G
17 POWDER, FOR SOLUTION ORAL
Qty: 0 | Refills: 0 | DISCHARGE
Start: 2023-04-03

## 2023-04-03 RX ORDER — ACETAMINOPHEN 500 MG
3 TABLET ORAL
Qty: 0 | Refills: 0 | DISCHARGE
Start: 2023-04-03

## 2023-04-03 RX ORDER — IBUPROFEN 200 MG
600 TABLET ORAL EVERY 6 HOURS
Refills: 0 | Status: DISCONTINUED | OUTPATIENT
Start: 2023-04-03 | End: 2023-04-03

## 2023-04-03 RX ORDER — IRON SUCROSE 20 MG/ML
200 INJECTION, SOLUTION INTRAVENOUS ONCE
Refills: 0 | Status: COMPLETED | OUTPATIENT
Start: 2023-04-03 | End: 2023-04-03

## 2023-04-03 RX ADMIN — SIMETHICONE 80 MILLIGRAM(S): 80 TABLET, CHEWABLE ORAL at 05:31

## 2023-04-03 RX ADMIN — Medication 30 MILLIGRAM(S): at 05:32

## 2023-04-03 RX ADMIN — Medication 30 MILLIGRAM(S): at 00:00

## 2023-04-03 RX ADMIN — Medication 30 MILLIGRAM(S): at 06:00

## 2023-04-03 RX ADMIN — IRON SUCROSE 110 MILLIGRAM(S): 20 INJECTION, SOLUTION INTRAVENOUS at 09:31

## 2023-04-03 RX ADMIN — Medication 975 MILLIGRAM(S): at 12:34

## 2023-04-03 RX ADMIN — Medication 975 MILLIGRAM(S): at 11:38

## 2023-04-03 NOTE — PROGRESS NOTE ADULT - PROBLEM SELECTOR PLAN 1
pod 6 management per gyn
pod 3 management per gyn
pod 3 management per gyn
pod 5 management per gyn
pod 2 management per gyn

## 2023-04-03 NOTE — PROGRESS NOTE ADULT - TIME BILLING
Lex Hardy [facility], [facility]  office [phone]  cell [phone]
[facility], [facility]  [facility] [phone]  cell [phone]
Lex Hardy MD, [facility]  office [phone]  cell [phone]
Lex Hardy [facility], [facility]  office [phone]  cell [phone]

## 2023-04-03 NOTE — PROGRESS NOTE ADULT - PROBLEM SELECTOR PROBLEM 5
losartan 100 mg oral tablet: 1 tab(s) orally once a day  Vitamin B-12 1000 mcg oral tablet: 1 tab(s) orally once a day  Vitamin D3 1000 intl units oral capsule: 1 cap(s) orally once a day  
Nausea and vomiting
acetaminophen 325 mg oral tablet: 2 tab(s) orally every 6 hours, As needed, Temp greater or equal to 38C (100.4F), Mild Pain (1 - 3)  cholecalciferol oral tablet: 1000 unit(s) orally once a day  cyanocobalamin 1000 mcg oral tablet: 1 tab(s) orally once a day  enoxaparin: 30 milligram(s) subcutaneous once a day.  Post op DVT PPx   losartan 25 mg oral tablet: 1 tab(s) orally once a day  magnesium hydroxide 8% oral suspension: 30 milliliter(s) orally once a day, As needed, Constipation  oxyCODONE: 2.5 milligram(s) orally every 4 hours (5 times/day), As Needed   Mioderate pain (4-6)  oxyCODONE 5 mg oral tablet: 1 tab(s) orally every 4 hours, As needed, Severe Pain (7 - 10)  polyethylene glycol 3350 oral powder for reconstitution: 17 gram(s) orally once a day  senna oral tablet: 2 tab(s) orally once a day (at bedtime)

## 2023-04-03 NOTE — PROGRESS NOTE ADULT - PROBLEM SELECTOR PLAN 5
improved possible ileus management per GYN
improved possible ileus management per GYN
resolved
possible ileus management per GYN

## 2023-04-03 NOTE — PROGRESS NOTE ADULT - PROVIDER SPECIALTY LIST ADULT
Anesthesia
Anesthesia
GYN
Internal Medicine

## 2023-04-03 NOTE — PROGRESS NOTE ADULT - ATTENDING COMMENTS
Patient seen at bedside. She states that her discomfort and distension have improved. She has started passing a small amount of flatus again.   Denies n/v overnight. Pain well controlled. Ambulating without difficulty.    VS and labs reviewed  abd softly distended, tympanic, BS present  incision c/d/i  LE without erythema or edema and SCDs in place    -plan for clears this PM  -BMP if pt has n/v  -CBC, BMP tomorrow AM  -c/w Iron txfn for anemia    D/w Dr. Dada Hernandez FMIGS-1
Patient seen at 630a    Tolerated water overnight. Declined additional clears as they "all had too much sugar." She complains fo constipation but denies n/v. Otherwise feels well. Would like to hold off on iron transfusion as she is concerned about continuing constipation.    Exam as above. VS reviewed. Labs pendings.    Colace no longer formulary. Ordered Miralax and metamucil. Advised patient to try prune juice this AM, continue with ambulation and gum chewing. Increase po hydration. If tolerating clears/liquids this AM, will transition to reg diet this PM. If constipation improves would advise continuing with iron transfusions, will follow up H/H this AM.    D/w Dr. Dada Hernandez MD
pt seen and examined. overall doing well today. feels much improved compared to the last two days. tolerated regular diet last night. pain overall controlled. passing gas and having BMs. for last dose of IV iron today and dc later today. reviewed precautions. to f/u in office next week.     kiana torres
41yo POD#3 s/p abdominal myomectomy through midline vertical incision. . Overnight she had episodes of nausea and vomiting and was made NPO. She feels improved while NPO but still has intermittent nausea. She is ambulating without difficulty. She is voiding and has passed flatus. She is also curious about her subq heparin, she would like to discontinue her doses.    sitting in chair, appears mildly nauseated  abd tympanic, softly distended, appropriately tender  midline vertical incision c/d/i with steris in place  LE symmetric without edema or erythema    We discussed her postop course and known potential complications including SBO/ileus. Discussed importance of bowel rest, movement, and time. Explained additional interventions such as NGT or imaging may be required if no improvement. Also discussed risk of VTE in the postop period. Importance of HSQ reviewed and advised her ot continue, at the very least she should have proper usage of SCDs and ambulation. All questions answered to her satisfaction. Will re-evaluate sx later today. If improved, consider advancing diet in PM vs tomorrow. All meds to be switched to IV and antiemetics ordered.    D/w Dr. Dada Hernandez IGS-1
Patient seen sitting up in bed eating breakfast. Reports pain is very well controlled with mild gas pain. Tolerating PO. Not yet OOB/Amb. No flatus/BM. Denies lightheadedness, HA/n/v/f/c/cp. VSS. AM labs with expected drop in Hgb. Abdominal exam with midline vertical incision with dressing in place with no surrounding erythema. Abdomen soft, mild distension, appropriately tender. Leigh catheter draining clear urine with appropriate UOP. Plan to d/c dpca this morning with transition to PO pain meds. Leigh to be d/c this AM and f/u void. ROutine postop care.  Andrew, FMIGS-1
pt seen and examined. rpt cbc this am stable. hematology consult this AM for IV iron. will continue routine postop care.     kiana torres

## 2023-04-03 NOTE — DISCHARGE NOTE NURSING/CASE MANAGEMENT/SOCIAL WORK - PATIENT PORTAL LINK FT
You can access the FollowMyHealth Patient Portal offered by [facility] by registering at the following website: [email or web address]. By joining [employer]’s FollowMyHealth portal, you will also be able to view your health information using other applications (apps) compatible with our system.

## 2023-04-03 NOTE — PROGRESS NOTE ADULT - SUBJECTIVE AND OBJECTIVE BOX
GYN Progress Note     POD #6     HD #7    Subjective:   Pt seen and examined at bedside. No events overnight. Patient tolerating regular diet. She is passing flatus, denies nausea, vomiting. Pain well controlled. Patient ambulating. Pt denies fever, chills, chest pain, SOB, lightheadedness, dizziness.      Vital Signs Last 24 Hours  T(C): 36.8 (04-03-23 @ 05:37), Max: 37.3 (04-02-23 @ 09:44)  HR: 92 (04-03-23 @ 05:37) (84 - 99)  BP: 139/85 (04-03-23 @ 05:37) (129/82 - 139/85)  RR: 18 (04-03-23 @ 05:37) (18 - 19)  SpO2: 100% (04-03-23 @ 05:37) (96% - 100%)    I&O's Summary    01 Apr 2023 07:01  -  02 Apr 2023 07:00  --------------------------------------------------------  IN: 1000 mL / OUT: 0 mL / NET: 1000 mL        Physical Exam:  Constitutional: NAD, A+O x3  CV: RRR  Lungs: clear to auscultation bilaterally  Abdomen: soft, softly distended, appropriately tender with out guarding, no rebound, normal bowel sounds.   Incision: midline vertical incision  clean, dry, intact w/ overlying steri strips  Extremities: no lower extremity edema or calf tenderness bilaterally; pt declining to wear venodynes.     Labs:                        6.8    6.78  )-----------( 331      ( 02 Apr 2023 06:42 )             23.7   baso x      eos x      imm gran x      lymph x      mono x      poly x                            7.3    5.15  )-----------( 362      ( 01 Apr 2023 06:43 )             24.5   baso 0.4    eos 6.6    imm gran 0.4    lymph 40.2   mono 12.2   poly 40.2                         8.0    9.69  )-----------( 375      ( 31 Mar 2023 07:01 )             27.1   baso x      eos x      imm gran x      lymph x      mono x      poly x          MEDICATIONS  (STANDING):  acetaminophen   IVPB .. 1000 milliGRAM(s) IV Intermittent once  ascorbic acid 500 milliGRAM(s) Oral daily  cefoTEtan  IVPB 2 Gram(s) IV Intermittent once  chlorhexidine 2% Cloths 1 Application(s) Topical once  heparin   Injectable 5000 Unit(s) SubCutaneous every 12 hours  iron sucrose IVPB 200 milliGRAM(s) IV Intermittent every 24 hours  ketorolac   Injectable 30 milliGRAM(s) IV Push every 6 hours  lidocaine 1% Injectable 0.2 milliLiter(s) Local Injection once  polyethylene glycol 3350 17 Gram(s) Oral at bedtime  psyllium Powder 1 Packet(s) Oral two times a day  sodium chloride 0.9% lock flush 3 milliLiter(s) IV Push every 8 hours    MEDICATIONS  (PRN):  ondansetron Injectable 4 milliGRAM(s) IV Push every 6 hours PRN Nausea and/or Vomiting  simethicone 80 milliGRAM(s) Chew every 6 hours PRN Gas

## 2023-04-03 NOTE — PROGRESS NOTE ADULT - SUBJECTIVE AND OBJECTIVE BOX
Subjective: feeling better today    Exam:  Gen: AA NAD  Neck: no JVD  Chest: normal shape and expansion  Heart: RRR s M  Lungs: CTAB  Abdomen: Soft, Mildly tender, mildly distended, tympanitic, no HSM, hypoactive bowel sounds  Ext: no CCE  Skin: no rash        Vital Signs Last 24 Hrs  T(C): 36.8 (03 Apr 2023 05:37), Max: 37.3 (02 Apr 2023 09:44)  T(F): 98.2 (03 Apr 2023 05:37), Max: 99.1 (02 Apr 2023 09:44)  HR: 92 (03 Apr 2023 05:37) (84 - 99)  BP: 139/85 (03 Apr 2023 05:37) (129/82 - 139/85)  BP(mean): --  RR: 18 (03 Apr 2023 05:37) (18 - 19)  SpO2: 100% (03 Apr 2023 05:37) (96% - 100%)    Parameters below as of 03 Apr 2023 05:37  Patient On (Oxygen Delivery Method): room air        Daily     Daily     I&O's Detail      Daily     CAPILLARY BLOOD GLUCOSE          MEDICATIONS  (STANDING):  acetaminophen     Tablet .. 975 milliGRAM(s) Oral every 6 hours  ascorbic acid 500 milliGRAM(s) Oral daily  cefoTEtan  IVPB 2 Gram(s) IV Intermittent once  chlorhexidine 2% Cloths 1 Application(s) Topical once  heparin   Injectable 5000 Unit(s) SubCutaneous every 12 hours  ibuprofen  Tablet. 600 milliGRAM(s) Oral every 6 hours  iron sucrose IVPB 200 milliGRAM(s) IV Intermittent once  lidocaine 1% Injectable 0.2 milliLiter(s) Local Injection once  polyethylene glycol 3350 17 Gram(s) Oral at bedtime  psyllium Powder 1 Packet(s) Oral two times a day  sodium chloride 0.9% lock flush 3 milliLiter(s) IV Push every 8 hours    MEDICATIONS  (PRN):  ondansetron Injectable 4 milliGRAM(s) IV Push every 6 hours PRN Nausea and/or Vomiting  simethicone 80 milliGRAM(s) Chew every 6 hours PRN Gas      Labs:                          7.8    9.35  )-----------( 395      ( 03 Apr 2023 06:35 )             27.1                         6.8    6.78  )-----------( 331      ( 02 Apr 2023 06:42 )             23.7                         7.3    5.15  )-----------( 362      ( 01 Apr 2023 06:43 )             24.5     04-03 @ 06:33    139  |  102  |  <4<L>  ----------------------------<  95  3.7   |  24  |  0.52    04-02 @ 06:43    140  |  101  |  4<L>  ----------------------------<  84  3.2<L>   |  25  |  0.54      Ca    9.2      04-03 @ 06:33  Ca    8.7      04-02 @ 06:43                      PT/INR - ( 30 Mar 2023 16:46 )   PT: 13.1 sec;   INR: 1.14 ratio         PTT - ( 30 Mar 2023 16:46 )  PTT:26.7 sec      Fibrinogen Javier: 556: Effective date 11/8/22, due to methodology change (Clauss Fibrinogen),   the fibrinogen reference range has been changed. mg/dL (03.30.23 @ 16:46)   Transferrin, Serum: 206 mg/dL (03.30.23 @ 16:46)   Reticulocyte Count (03.30.23 @ 16:46)   RBC Count: 3.01 M/uL  Reticulocyte Percent: 1.9 %  Absolute Reticulocytes: 55.7 K/uL  Haptoglobin, Serum: 225 mg/dL (03.30.23 @ 16:46)   Lactate Dehydrogenase, Serum: 456 U/L (03.30.23 @ 16:46)   Vitamin B12, Serum: 491 pg/mL (03.30.23 @ 16:46)    Folate, Serum: 13.5 ng/mL (03.30.23 @ 16:46)   Ferritin, Serum: 42 ng/mL (03.30.23 @ 16:46)

## 2023-04-03 NOTE — DISCHARGE NOTE NURSING/CASE MANAGEMENT/SOCIAL WORK - NSDCPNINST_GEN_ALL_CORE
Keep your follow up appointment with doctor as instructed and call doctor with recurrence of nausea and vomiting, any signs of infection, fevers, chills, difficulty urinating, moving bowels, incision opens up draining, swollen, reddened, warm to touch, heavy vaginal bleeding or passing clots, chest discomfort, pain behind your legs, feeling dizzy or light headed and with any questions or concerns. No heavy lifting, no driving and nothing per vagina-no douching, intercourse, tub bathing, pools until cleared by your doctor. No creams or lotions to incision.

## 2023-04-03 NOTE — PROGRESS NOTE ADULT - PROBLEM SELECTOR PROBLEM 4
Encounter for deep vein thrombosis (DVT) prophylaxis

## 2023-04-03 NOTE — PROGRESS NOTE ADULT - ASSESSMENT
Assessment/Plan: 42y female POD#6 s/p  abdominal myomectomy with midline vertical incision, uncomplicated, . Patient with increased nausea and vomiting overnight on POD#2 with concern for post operative ileus. Patient was advanced to clears on POD#5 and subsequently regular diet. Now tolerating regular diet, passing gas, and having bowel movements. Neuro: Pain well controlled with Tylenol, Motrin.    CV: Hemodynamically stable  - Hct 32.2->29.2->26.0->23.2->26.0->27.1->34->23->f/u AM CBC  - Continue IV Fe/Vit C daily  - Appreciate Heme recs: IV Venofer x5 days, transfuse for Hb <7, cont PO Fe sulfate.   Pulm: Saturating well on room air, encourage oob/amb, encourage use of incentive spirometry  GI: CLD, will advance further when patient is tolerating more than water.    - Zofran, Simethicone PRN  - Metamucil, Colace PRN  : Voiding spontaneously  Heme: c/w HSQ, ambulation and SCDs for DVT ppx (pt refused SCDs overnight)  FEN: SLIV Replete electrolytes PRN  ID: Afebrile  Endo: No active issues     Dispo: Discharge planning     Harleen Pan, PGY2

## 2023-04-03 NOTE — PROGRESS NOTE ADULT - PROBLEM SELECTOR PLAN 2
preoperative Hgb 9.5. now down to 7 with some symptoms could be attributable to anemia still some ongoing vaginal blood loss but mild per pt. would recommend 1 u prbcs. alternatively can do injectafer.  pt has seen dr juarez for heme onc as outpt
preoperative Hgb 9.5. went down to 7 now up to 8
preoperative Hgb 9.5. went down to 7 up to 8 then down to 7.3 then 6.8 now 7.8
preoperative Hgb 9.5. went down to 7 up to 8 then down to 7.3 now 6.8
preoperative Hgb 9.5. went down to 7 up to 8 now down to 7.3 follow closely

## 2023-04-07 LAB — SURGICAL PATHOLOGY STUDY: SIGNIFICANT CHANGE UP

## 2023-04-10 ENCOUNTER — APPOINTMENT (OUTPATIENT)
Dept: OBGYN | Facility: CLINIC | Age: 43
End: 2023-04-10
Payer: MEDICAID

## 2023-04-10 ENCOUNTER — LABORATORY RESULT (OUTPATIENT)
Age: 43
End: 2023-04-10

## 2023-04-10 VITALS
HEIGHT: 62 IN | BODY MASS INDEX: 31.28 KG/M2 | DIASTOLIC BLOOD PRESSURE: 86 MMHG | WEIGHT: 170 LBS | SYSTOLIC BLOOD PRESSURE: 137 MMHG

## 2023-04-10 PROCEDURE — 0503F POSTPARTUM CARE VISIT: CPT

## 2023-04-10 NOTE — ASSESSMENT
[Doing Well] : is doing well [Excellent Pain Control] : has excellent pain control [No Sign of Infection] : is showing no signs of infection [de-identified] : 41 yo female pt s/p open myomectomy. Stable.

## 2023-04-10 NOTE — PLAN
[No [place]] : to avoid sexual intercourse [No Sports] : not to participate in sports [de-identified] : Steri strips removed today [FreeTextEntry1] : 43 yo female pt s/p open myomectomy\par \par -CBC done today\par -rx hydrocortisone 2.5% for scar management \par -if burning during urination continues to occur, rto to leave a urine sample\par -rto 3 weeks

## 2023-04-10 NOTE — HISTORY OF PRESENT ILLNESS
[Pain is well-controlled] : pain is well-controlled [Fever] : no fever [Chills] : no chills [Nausea] : no nausea [Vomiting] : no vomiting [Clean/Dry/Intact] : clean, dry and intact [Erythema] : not erythematous [None] : no vaginal bleeding [Normal] : normal [Pathology reviewed] : pathology reviewed [de-identified] : 3/28/23 [de-identified] : open myomectomy [de-identified] : 43 yo female pt s/p open myomectomy. She is currently not taking any pain medication. She does not feel much pain anymore and feels a lot better. The pt also reports normal bowel movements. The pt does report feeling slight burning post op, but as of recently has not felt any burning. She also reports being very gassy. the pt is returning to work in June.\par \par Operative findings:\par \par Pathology:

## 2023-04-13 LAB
BASOPHILS # BLD AUTO: 0.07 K/UL
BASOPHILS NFR BLD AUTO: 1 %
EOSINOPHIL # BLD AUTO: 0.31 K/UL
EOSINOPHIL NFR BLD AUTO: 4.5 %
FERRITIN SERPL-MCNC: 405 NG/ML
HCT VFR BLD CALC: 34.3 %
HGB BLD-MCNC: 10 G/DL
IMM GRANULOCYTES NFR BLD AUTO: 0.9 %
IRON SATN MFR SERPL: 17 %
IRON SERPL-MCNC: 54 UG/DL
LYMPHOCYTES # BLD AUTO: 2.06 K/UL
LYMPHOCYTES NFR BLD AUTO: 30.2 %
MAN DIFF?: NORMAL
MCHC RBC-ENTMCNC: 24.2 PG
MCHC RBC-ENTMCNC: 29.2 GM/DL
MCV RBC AUTO: 83.1 FL
MONOCYTES # BLD AUTO: 0.43 K/UL
MONOCYTES NFR BLD AUTO: 6.3 %
NEUTROPHILS # BLD AUTO: 3.9 K/UL
NEUTROPHILS NFR BLD AUTO: 57.1 %
PLATELET # BLD AUTO: 426 K/UL
RBC # BLD: 4.13 M/UL
RBC # FLD: 24.4 %
TIBC SERPL-MCNC: 317 UG/DL
UIBC SERPL-MCNC: 264 UG/DL
WBC # FLD AUTO: 6.83 K/UL

## 2023-04-14 NOTE — PROGRESS NOTE ADULT - ATTENDING SUPERVISION STATEMENT
Resident
on the discharge service for the patient. I have reviewed and made amendments to the documentation where necessary.

## 2023-05-05 ENCOUNTER — EMERGENCY (EMERGENCY)
Facility: HOSPITAL | Age: 43
LOS: 1 days | Discharge: HOME | End: 2023-05-05
Attending: EMERGENCY MEDICINE
Payer: COMMERCIAL

## 2023-05-05 VITALS
DIASTOLIC BLOOD PRESSURE: 80 MMHG | TEMPERATURE: 99 F | OXYGEN SATURATION: 98 % | SYSTOLIC BLOOD PRESSURE: 119 MMHG | RESPIRATION RATE: 18 BRPM | HEART RATE: 93 BPM

## 2023-05-05 VITALS
RESPIRATION RATE: 22 BRPM | SYSTOLIC BLOOD PRESSURE: 151 MMHG | HEART RATE: 114 BPM | DIASTOLIC BLOOD PRESSURE: 104 MMHG | WEIGHT: 175.05 LBS | HEIGHT: 62 IN | OXYGEN SATURATION: 98 % | TEMPERATURE: 98 F

## 2023-05-05 DIAGNOSIS — Z98.891 HISTORY OF UTERINE SCAR FROM PREVIOUS SURGERY: Chronic | ICD-10-CM

## 2023-05-05 LAB
ALBUMIN SERPL ELPH-MCNC: 4.6 G/DL — SIGNIFICANT CHANGE UP (ref 3.3–5)
ALP SERPL-CCNC: 67 U/L — SIGNIFICANT CHANGE UP (ref 40–120)
ALT FLD-CCNC: 14 U/L — SIGNIFICANT CHANGE UP (ref 10–45)
ANION GAP SERPL CALC-SCNC: 13 MMOL/L — SIGNIFICANT CHANGE UP (ref 5–17)
AST SERPL-CCNC: 14 U/L — SIGNIFICANT CHANGE UP (ref 10–40)
BASOPHILS # BLD AUTO: 0.07 K/UL — SIGNIFICANT CHANGE UP (ref 0–0.2)
BASOPHILS NFR BLD AUTO: 0.9 % — SIGNIFICANT CHANGE UP (ref 0–2)
BILIRUB SERPL-MCNC: 0.4 MG/DL — SIGNIFICANT CHANGE UP (ref 0.2–1.2)
BUN SERPL-MCNC: 10 MG/DL — SIGNIFICANT CHANGE UP (ref 7–23)
CALCIUM SERPL-MCNC: 10.1 MG/DL — SIGNIFICANT CHANGE UP (ref 8.4–10.5)
CHLORIDE SERPL-SCNC: 104 MMOL/L — SIGNIFICANT CHANGE UP (ref 96–108)
CO2 SERPL-SCNC: 24 MMOL/L — SIGNIFICANT CHANGE UP (ref 22–31)
CREAT SERPL-MCNC: 0.73 MG/DL — SIGNIFICANT CHANGE UP (ref 0.5–1.3)
D DIMER BLD IA.RAPID-MCNC: <150 NG/ML DDU — SIGNIFICANT CHANGE UP
EGFR: 105 ML/MIN/1.73M2 — SIGNIFICANT CHANGE UP
EOSINOPHIL # BLD AUTO: 0.16 K/UL — SIGNIFICANT CHANGE UP (ref 0–0.5)
EOSINOPHIL NFR BLD AUTO: 2 % — SIGNIFICANT CHANGE UP (ref 0–6)
GLUCOSE SERPL-MCNC: 84 MG/DL — SIGNIFICANT CHANGE UP (ref 70–99)
HCT VFR BLD CALC: 41.3 % — SIGNIFICANT CHANGE UP (ref 34.5–45)
HGB BLD-MCNC: 12.5 G/DL — SIGNIFICANT CHANGE UP (ref 11.5–15.5)
IMM GRANULOCYTES NFR BLD AUTO: 0.9 % — SIGNIFICANT CHANGE UP (ref 0–0.9)
LYMPHOCYTES # BLD AUTO: 1.6 K/UL — SIGNIFICANT CHANGE UP (ref 1–3.3)
LYMPHOCYTES # BLD AUTO: 20.4 % — SIGNIFICANT CHANGE UP (ref 13–44)
MCHC RBC-ENTMCNC: 24 PG — LOW (ref 27–34)
MCHC RBC-ENTMCNC: 30.3 GM/DL — LOW (ref 32–36)
MCV RBC AUTO: 79.4 FL — LOW (ref 80–100)
MONOCYTES # BLD AUTO: 0.53 K/UL — SIGNIFICANT CHANGE UP (ref 0–0.9)
MONOCYTES NFR BLD AUTO: 6.8 % — SIGNIFICANT CHANGE UP (ref 2–14)
NEUTROPHILS # BLD AUTO: 5.4 K/UL — SIGNIFICANT CHANGE UP (ref 1.8–7.4)
NEUTROPHILS NFR BLD AUTO: 69 % — SIGNIFICANT CHANGE UP (ref 43–77)
NRBC # BLD: 0 /100 WBCS — SIGNIFICANT CHANGE UP (ref 0–0)
PLATELET # BLD AUTO: 287 K/UL — SIGNIFICANT CHANGE UP (ref 150–400)
POTASSIUM SERPL-MCNC: 3.8 MMOL/L — SIGNIFICANT CHANGE UP (ref 3.5–5.3)
POTASSIUM SERPL-SCNC: 3.8 MMOL/L — SIGNIFICANT CHANGE UP (ref 3.5–5.3)
PROT SERPL-MCNC: 7.5 G/DL — SIGNIFICANT CHANGE UP (ref 6–8.3)
RBC # BLD: 5.2 M/UL — SIGNIFICANT CHANGE UP (ref 3.8–5.2)
RBC # FLD: 19.9 % — HIGH (ref 10.3–14.5)
SODIUM SERPL-SCNC: 141 MMOL/L — SIGNIFICANT CHANGE UP (ref 135–145)
TROPONIN T, HIGH SENSITIVITY RESULT: 6 NG/L — SIGNIFICANT CHANGE UP (ref 0–51)
WBC # BLD: 7.83 K/UL — SIGNIFICANT CHANGE UP (ref 3.8–10.5)
WBC # FLD AUTO: 7.83 K/UL — SIGNIFICANT CHANGE UP (ref 3.8–10.5)

## 2023-05-05 PROCEDURE — 71045 X-RAY EXAM CHEST 1 VIEW: CPT | Mod: 26

## 2023-05-05 PROCEDURE — 84443 ASSAY THYROID STIM HORMONE: CPT

## 2023-05-05 PROCEDURE — 80053 COMPREHEN METABOLIC PANEL: CPT

## 2023-05-05 PROCEDURE — 85025 COMPLETE CBC W/AUTO DIFF WBC: CPT

## 2023-05-05 PROCEDURE — 99285 EMERGENCY DEPT VISIT HI MDM: CPT | Mod: 25

## 2023-05-05 PROCEDURE — 85379 FIBRIN DEGRADATION QUANT: CPT

## 2023-05-05 PROCEDURE — 36415 COLL VENOUS BLD VENIPUNCTURE: CPT

## 2023-05-05 PROCEDURE — 99285 EMERGENCY DEPT VISIT HI MDM: CPT

## 2023-05-05 PROCEDURE — 84484 ASSAY OF TROPONIN QUANT: CPT

## 2023-05-05 PROCEDURE — 93005 ELECTROCARDIOGRAM TRACING: CPT

## 2023-05-05 PROCEDURE — 71045 X-RAY EXAM CHEST 1 VIEW: CPT

## 2023-05-05 RX ORDER — SODIUM CHLORIDE 9 MG/ML
1000 INJECTION INTRAMUSCULAR; INTRAVENOUS; SUBCUTANEOUS ONCE
Refills: 0 | Status: COMPLETED | OUTPATIENT
Start: 2023-05-05 | End: 2023-05-05

## 2023-05-05 RX ADMIN — SODIUM CHLORIDE 1000 MILLILITER(S): 9 INJECTION INTRAMUSCULAR; INTRAVENOUS; SUBCUTANEOUS at 18:08

## 2023-05-05 NOTE — ED PROVIDER NOTE - PATIENT PORTAL LINK FT
You can access the FollowMyHealth Patient Portal offered by [facility] by registering at the following website: [email or web address]. By joining [employer]’s FollowMyHealth portal, you will also be able to view your health information using other applications (apps) compatible with our system.

## 2023-05-05 NOTE — ED PROVIDER NOTE - CLINICAL SUMMARY MEDICAL DECISION MAKING FREE TEXT BOX
42F here for palpitations for 2 days. The patient's risk factors for ACS were reviewed as well as the initial EKG.  The CXR helps to exclude pneumonia, pneumothorax, or esophageal tears.  The patient does not require CT angiogram to rule out a pulmonary embolism based on the Wells Score and/or PERC rule. Will obtain D-dimer instead. There are no concerning features of history of exam that are strongly suggestive of pericarditis, endocarditis, or myocarditis. There is no fever.  There does not appear to be an aortic dissection either based on history or physical exam. Will obtain serial EKG's as indicated for evolving symptoms, an initial troponin, maintain on cardiac monitor, reassess.

## 2023-05-05 NOTE — ED ADULT NURSE NOTE - NURSING ED SKIN COLOR
----- Message from Jaqui Kristine sent at 2017  9:11 AM EDT -----  Regardin yr recall  Contact: [phone]  Pt thinks it is time for a 5 yr colon recall for him.  I could not find any history on it.  He had previous hx of polyps not last colonoscopy though.  Can you check for him and call him back?  Thanks.   normal for race

## 2023-05-05 NOTE — ED PROVIDER NOTE - NSICDXPASTMEDICALHX_GEN_ALL_CORE_FT
PAST MEDICAL HISTORY:  2019 novel coronavirus disease (COVID-19)     History of palpitations     Iron deficiency anemia     Leiomyoma of uterus     Obesity (BMI 30.0-34.9)

## 2023-05-05 NOTE — ED PROVIDER NOTE - OBJECTIVE STATEMENT
42F past medical history myomectomy 6 weeks ago, iron deficiency anemia, presents to emergency department with complaint of 2d of palpitations associated with intermittent headaches. She has been more sedentary since procedure. Not on OCPs. No shortness of breath. She denies fevers, chills, nausea, vomiting, diarrhea. She states that her hemoglobin has been as low as 6 in past but recently has been near 10 due to iron transfusions. She has not taken anything for these symptoms. Normal PO intake.

## 2023-05-05 NOTE — ED PROVIDER NOTE - ATTENDING CONTRIBUTION TO CARE
42F past medical history myomectomy 6 weeks ago, iron deficiency anemia, presents to emergency department with complaint of 2d of palpitations with cp, headache, has had prior cards work up with holter, echo states this happens when she is anemic, but last period 4 weeks ago, ekg ST only, labs check, on cr monitor likely dc with outpt cards follow up, appears anxious could be contributing. not on ocp.

## 2023-05-05 NOTE — ED ADULT NURSE NOTE - OBJECTIVE STATEMENT
42 year old female s/p fibroid removal 6 weeks ago come sin today with left side chest discomfort radiating to th e left shoulder and to the back Pt states it started today after eating.  Pt heartrate  pt endorses palpitations .  No fever chills felling fine otherwise IVL placed and labs drawn Pending md to see pt Macie

## 2023-05-05 NOTE — ED ADULT NURSE NOTE - SUICIDE SCREENING QUESTION 2
MELD-Na: 19 on admission, now improving.  - Given positive hepatitis b core and surface antibodies, suspect his cirrhosis is secondary to chronic hepatitis b infection    = Tenofovir 25mg qd  - No signs of hepatic encephalopathy/encephalopathy  - No EGD in the past, denies hematemesis or GI bleed  - furosemide 60 and spironolactone 200 initiated this admission   - Hepatology recs: f/u multiple labs sent: iron panel, copper level, anti-smooth muscle antibody, a1AT deficiency, acute hepatitis panel, schistoma antibody negative   - s/p albumin on 2/11, 2/13 along with ceftriaxone for treatment of spontaneous bacterial peritonitis; will need prophylaxis with oral fluoroquinolones after completion of antibiotic course for treatment of cellulitis   - MRI with contrast performed and re-demonstrates nodule highly concerning for hepatocellular carcinoma vs. cholangiocarcinoma  - Pending tumor board discussion; patient is likely to be a candidate for liver transplant  - Paracentesis attempted yesterday, no good pocket found >> IR may do paracentesis today prior to liver biopsy  - CEA, CA 19-9 normal; IR consulted for liver biopsy  - Liver biopsy today, d/c tomorrow MELD-Na: 19 on admission, now improving.  - Given positive hepatitis b core and surface antibodies, suspect his cirrhosis is secondary to chronic hepatitis b infection    = Tenofovir 25mg qd  - No signs of hepatic encephalopathy/encephalopathy  - No EGD in the past, denies hematemesis or GI bleed  - furosemide 60 and spironolactone 200 initiated this admission   - Hepatology recs: f/u multiple labs sent: iron panel, copper level, anti-smooth muscle antibody, a1AT deficiency, acute hepatitis panel, schistoma antibody negative   - s/p albumin on 2/11, 2/13 along with ceftriaxone for treatment of spontaneous bacterial peritonitis; will need prophylaxis with oral fluoroquinolones after completion of antibiotic course for treatment of cellulitis   - MRI with contrast performed and re-demonstrates nodule highly concerning for hepatocellular carcinoma vs. cholangiocarcinoma  - Pending tumor board discussion; patient is likely to be a candidate for liver transplant  - Paracentesis attempted yesterday, no good pocket found >> IR may do paracentesis today prior to liver biopsy  - CEA, CA 19-9 normal; IR consulted for liver biopsy  - Liver biopsy 2/24; d/c today - outpatient f/u appointment made, provided in d/c paperwork and sister made aware No

## 2023-05-05 NOTE — ED PROVIDER NOTE - PHYSICAL EXAMINATION
General: well appearing, alert, oriented to person, time, place  Psych: mood appropriate  Head: normocephalic; atraumatic  Eyes: conjunctivae clear bilaterally, sclerae anicteric  ENT: no nasal flaring, patent nares  Cardio: sinus tachycardia; no m/r/g, pulses 2+ b/l  Resp: CATB, no w/r/r  GI: soft/nondistended/nontender  : no CVA tenderness  Neuro: normal sensation, moving all four extremities equally  Skin: No evidence of rash or bruising  MSK: normal movement of all extremities  Lymph/Vasc: no LE edema

## 2023-05-05 NOTE — ED PROVIDER NOTE - PROGRESS NOTE DETAILS
Lex Dos Santos MD, PGY-1: Results reviewed, patient safe for discharge. She will follow up with her cardiologist.

## 2023-05-05 NOTE — ED PROVIDER NOTE - NSFOLLOWUPINSTRUCTIONS_ED_ALL_ED_FT
You were seen in the emergency department for palpitations. We have evaluated you and determined that you do not require further hospital interventions.    During your stay you had the following relevant results: hemoglobin of 12.5, normal d-dimer (marker for blood clots)    Please follow up with your CARDIOLOGIST to discuss the results of your stay in our department.    If you start to experience worsening symptoms such as chest pain, shortness of breath, high-grade fevers (>= 103 °F), please return to the emergency department for further evaluation.

## 2023-05-06 LAB — TSH SERPL-MCNC: 0.76 UIU/ML — SIGNIFICANT CHANGE UP (ref 0.27–4.2)

## 2023-05-10 ENCOUNTER — APPOINTMENT (OUTPATIENT)
Dept: OBGYN | Facility: CLINIC | Age: 43
End: 2023-05-10
Payer: MEDICAID

## 2023-05-10 VITALS
DIASTOLIC BLOOD PRESSURE: 84 MMHG | HEART RATE: 100 BPM | SYSTOLIC BLOOD PRESSURE: 130 MMHG | OXYGEN SATURATION: 99 % | HEIGHT: 62 IN | WEIGHT: 170 LBS | RESPIRATION RATE: 18 BRPM | BODY MASS INDEX: 31.28 KG/M2

## 2023-05-10 DIAGNOSIS — D64.9 ANEMIA, UNSPECIFIED: ICD-10-CM

## 2023-05-10 PROCEDURE — 99024 POSTOP FOLLOW-UP VISIT: CPT

## 2023-05-10 NOTE — ASSESSMENT
[Doing Well] : is doing well [Excellent Pain Control] : has excellent pain control [No Sign of Infection] : is showing no signs of infection [de-identified] : 43 yo female pt s/p open myomectomy. Stable.

## 2023-05-10 NOTE — HISTORY OF PRESENT ILLNESS
[Pain is well-controlled] : pain is well-controlled [Clean/Dry/Intact] : clean, dry and intact [None] : no vaginal bleeding [Normal] : normal [Pathology reviewed] : pathology reviewed [Fever] : no fever [Chills] : no chills [Nausea] : no nausea [Vomiting] : no vomiting [Erythema] : not erythematous [de-identified] : 3/28/23 [de-identified] : open myomectomy [de-identified] : 43 yo female pt present for f/u s/p open myomectomy. She reports heavier periods later in her cycle of normal duration (6 days). She also notes light cramping. She reports tachycardia and irritation prior to her period. She notes that she sees a cardiologist but denies having had month-long monitoring before. She does report drinking 60-70 ounces of water a day.

## 2023-05-16 LAB
BASOPHILS # BLD AUTO: 0.06 K/UL
BASOPHILS NFR BLD AUTO: 0.9 %
EOSINOPHIL # BLD AUTO: 0.2 K/UL
EOSINOPHIL NFR BLD AUTO: 2.8 %
HCT VFR BLD CALC: 32.9 %
HGB BLD-MCNC: 9.8 G/DL
IMM GRANULOCYTES NFR BLD AUTO: 0.3 %
LYMPHOCYTES # BLD AUTO: 2.36 K/UL
LYMPHOCYTES NFR BLD AUTO: 33.6 %
MAN DIFF?: NORMAL
MCHC RBC-ENTMCNC: 24 PG
MCHC RBC-ENTMCNC: 29.8 GM/DL
MCV RBC AUTO: 80.6 FL
MONOCYTES # BLD AUTO: 0.43 K/UL
MONOCYTES NFR BLD AUTO: 6.1 %
NEUTROPHILS # BLD AUTO: 3.95 K/UL
NEUTROPHILS NFR BLD AUTO: 56.3 %
PLATELET # BLD AUTO: 289 K/UL
RBC # BLD: 4.08 M/UL
RBC # FLD: 19.9 %
WBC # FLD AUTO: 7.02 K/UL

## 2023-05-22 ENCOUNTER — EMERGENCY (EMERGENCY)
Facility: HOSPITAL | Age: 43
LOS: 1 days | Discharge: HOME | End: 2023-05-22
Attending: EMERGENCY MEDICINE
Payer: COMMERCIAL

## 2023-05-22 VITALS
DIASTOLIC BLOOD PRESSURE: 80 MMHG | SYSTOLIC BLOOD PRESSURE: 126 MMHG | OXYGEN SATURATION: 100 % | WEIGHT: 169.98 LBS | TEMPERATURE: 98 F | HEIGHT: 62 IN | RESPIRATION RATE: 20 BRPM | HEART RATE: 115 BPM

## 2023-05-22 VITALS
OXYGEN SATURATION: 98 % | RESPIRATION RATE: 20 BRPM | SYSTOLIC BLOOD PRESSURE: 130 MMHG | TEMPERATURE: 99 F | DIASTOLIC BLOOD PRESSURE: 86 MMHG | HEART RATE: 92 BPM

## 2023-05-22 DIAGNOSIS — Z98.891 HISTORY OF UTERINE SCAR FROM PREVIOUS SURGERY: Chronic | ICD-10-CM

## 2023-05-22 LAB
ALBUMIN SERPL ELPH-MCNC: 4.3 G/DL — SIGNIFICANT CHANGE UP (ref 3.3–5)
ALP SERPL-CCNC: 64 U/L — SIGNIFICANT CHANGE UP (ref 40–120)
ALT FLD-CCNC: 18 U/L — SIGNIFICANT CHANGE UP (ref 10–45)
ANION GAP SERPL CALC-SCNC: 15 MMOL/L — SIGNIFICANT CHANGE UP (ref 5–17)
APTT BLD: 29.2 SEC — SIGNIFICANT CHANGE UP (ref 27.5–35.5)
AST SERPL-CCNC: 48 U/L — HIGH (ref 10–40)
BASOPHILS # BLD AUTO: 0.06 K/UL — SIGNIFICANT CHANGE UP (ref 0–0.2)
BASOPHILS NFR BLD AUTO: 1 % — SIGNIFICANT CHANGE UP (ref 0–2)
BILIRUB SERPL-MCNC: 0.3 MG/DL — SIGNIFICANT CHANGE UP (ref 0.2–1.2)
BLD GP AB SCN SERPL QL: NEGATIVE — SIGNIFICANT CHANGE UP
BUN SERPL-MCNC: 5 MG/DL — LOW (ref 7–23)
CALCIUM SERPL-MCNC: 9.1 MG/DL — SIGNIFICANT CHANGE UP (ref 8.4–10.5)
CHLORIDE SERPL-SCNC: 105 MMOL/L — SIGNIFICANT CHANGE UP (ref 96–108)
CO2 SERPL-SCNC: 19 MMOL/L — LOW (ref 22–31)
CREAT SERPL-MCNC: 0.58 MG/DL — SIGNIFICANT CHANGE UP (ref 0.5–1.3)
EGFR: 115 ML/MIN/1.73M2 — SIGNIFICANT CHANGE UP
EOSINOPHIL # BLD AUTO: 0.11 K/UL — SIGNIFICANT CHANGE UP (ref 0–0.5)
EOSINOPHIL NFR BLD AUTO: 1.9 % — SIGNIFICANT CHANGE UP (ref 0–6)
GLUCOSE SERPL-MCNC: 98 MG/DL — SIGNIFICANT CHANGE UP (ref 70–99)
HCG SERPL-ACNC: <2 MIU/ML — SIGNIFICANT CHANGE UP
HCT VFR BLD CALC: 31.8 % — LOW (ref 34.5–45)
HGB BLD-MCNC: 9.6 G/DL — LOW (ref 11.5–15.5)
IMM GRANULOCYTES NFR BLD AUTO: 0.2 % — SIGNIFICANT CHANGE UP (ref 0–0.9)
INR BLD: 0.95 RATIO — SIGNIFICANT CHANGE UP (ref 0.88–1.16)
LYMPHOCYTES # BLD AUTO: 1.62 K/UL — SIGNIFICANT CHANGE UP (ref 1–3.3)
LYMPHOCYTES # BLD AUTO: 27.3 % — SIGNIFICANT CHANGE UP (ref 13–44)
MAGNESIUM SERPL-MCNC: 2.1 MG/DL — SIGNIFICANT CHANGE UP (ref 1.6–2.6)
MCHC RBC-ENTMCNC: 24.2 PG — LOW (ref 27–34)
MCHC RBC-ENTMCNC: 30.2 GM/DL — LOW (ref 32–36)
MCV RBC AUTO: 80.1 FL — SIGNIFICANT CHANGE UP (ref 80–100)
MONOCYTES # BLD AUTO: 0.4 K/UL — SIGNIFICANT CHANGE UP (ref 0–0.9)
MONOCYTES NFR BLD AUTO: 6.7 % — SIGNIFICANT CHANGE UP (ref 2–14)
NEUTROPHILS # BLD AUTO: 3.73 K/UL — SIGNIFICANT CHANGE UP (ref 1.8–7.4)
NEUTROPHILS NFR BLD AUTO: 62.9 % — SIGNIFICANT CHANGE UP (ref 43–77)
NRBC # BLD: 0 /100 WBCS — SIGNIFICANT CHANGE UP (ref 0–0)
PLATELET # BLD AUTO: 533 K/UL — HIGH (ref 150–400)
POTASSIUM SERPL-MCNC: 4.6 MMOL/L — SIGNIFICANT CHANGE UP (ref 3.5–5.3)
POTASSIUM SERPL-SCNC: 4.6 MMOL/L — SIGNIFICANT CHANGE UP (ref 3.5–5.3)
PROT SERPL-MCNC: 7.4 G/DL — SIGNIFICANT CHANGE UP (ref 6–8.3)
PROTHROM AB SERPL-ACNC: 11 SEC — SIGNIFICANT CHANGE UP (ref 10.5–13.4)
RBC # BLD: 3.97 M/UL — SIGNIFICANT CHANGE UP (ref 3.8–5.2)
RBC # FLD: 19.5 % — HIGH (ref 10.3–14.5)
RH IG SCN BLD-IMP: POSITIVE — SIGNIFICANT CHANGE UP
SODIUM SERPL-SCNC: 139 MMOL/L — SIGNIFICANT CHANGE UP (ref 135–145)
TROPONIN T, HIGH SENSITIVITY RESULT: 6 NG/L — SIGNIFICANT CHANGE UP (ref 0–51)
TSH SERPL-MCNC: 2.19 UIU/ML — SIGNIFICANT CHANGE UP (ref 0.27–4.2)
WBC # BLD: 5.93 K/UL — SIGNIFICANT CHANGE UP (ref 3.8–10.5)
WBC # FLD AUTO: 5.93 K/UL — SIGNIFICANT CHANGE UP (ref 3.8–10.5)

## 2023-05-22 PROCEDURE — 86850 RBC ANTIBODY SCREEN: CPT

## 2023-05-22 PROCEDURE — 36415 COLL VENOUS BLD VENIPUNCTURE: CPT

## 2023-05-22 PROCEDURE — 83735 ASSAY OF MAGNESIUM: CPT

## 2023-05-22 PROCEDURE — 99285 EMERGENCY DEPT VISIT HI MDM: CPT | Mod: 25

## 2023-05-22 PROCEDURE — 84484 ASSAY OF TROPONIN QUANT: CPT

## 2023-05-22 PROCEDURE — 71045 X-RAY EXAM CHEST 1 VIEW: CPT | Mod: 26

## 2023-05-22 PROCEDURE — 85610 PROTHROMBIN TIME: CPT

## 2023-05-22 PROCEDURE — 99284 EMERGENCY DEPT VISIT MOD MDM: CPT

## 2023-05-22 PROCEDURE — 86900 BLOOD TYPING SEROLOGIC ABO: CPT

## 2023-05-22 PROCEDURE — 93005 ELECTROCARDIOGRAM TRACING: CPT

## 2023-05-22 PROCEDURE — 84702 CHORIONIC GONADOTROPIN TEST: CPT

## 2023-05-22 PROCEDURE — 84443 ASSAY THYROID STIM HORMONE: CPT

## 2023-05-22 PROCEDURE — 71045 X-RAY EXAM CHEST 1 VIEW: CPT

## 2023-05-22 PROCEDURE — 85025 COMPLETE CBC W/AUTO DIFF WBC: CPT

## 2023-05-22 PROCEDURE — 85730 THROMBOPLASTIN TIME PARTIAL: CPT

## 2023-05-22 PROCEDURE — 86901 BLOOD TYPING SEROLOGIC RH(D): CPT

## 2023-05-22 PROCEDURE — 80053 COMPREHEN METABOLIC PANEL: CPT

## 2023-05-22 NOTE — ED PROVIDER NOTE - CLINICAL SUMMARY MEDICAL DECISION MAKING FREE TEXT BOX
42-year-old female with heart palpitations that began at 9 PM today states heart rate went up to 140 when she was checking at home.  Has had similar episodes before in the past that have been secondary to anemia.  Patient with anemia from heavy menstrual cycles and uterine fibroids.  Had myomectomy 1 month ago.  Had first menstrual cycle since surgery 1 week ago.  States had heavy menstrual bleeding during this past cycle.  Symptoms have been improving since being in the emergency department.  Had mild chest discomfort and shortness of breath at onset that is since resolved.  Heart rate between 90 and 110 in the emergency department otherwise vitals stable.  Unremarkable exam.  Symptoms likely secondary to anemia.  Also considering arrhythmia, thyroid.  Considered pulmonary embolism, but patient has other reasons for tachycardia and has had similar symptoms before in the past.  If patient does not have anemia or EKG findings to explain current symptoms we will consider sending D-dimer.

## 2023-05-22 NOTE — ED PROVIDER NOTE - OBJECTIVE STATEMENT
42-year-old female with past medical history of anemia secondary to heavy menstrual periods and uterine fibroids now status post myomectomy on 3–28 presenting to emergency department with feeling of heart palpitations that began today at 9 PM.  Has had similar episodes before in the past have been secondary to anemia.  Patient had last menstrual period which ended 3 days ago and reporting heavy menses during past cycle.  States symptoms have been improving since onset but still feels like heart is racing.  At onset of symptoms had mild chest discomfort and shortness of breath that has since improved.  Denies fever cough, abdominal pain, dysuria, rash.  Has had recent work-up with cardiologist for similar symptoms with no significant findings. No OCP, No hemoptysis, No other source of bleeding, No smoking, No recent immobilization.

## 2023-05-22 NOTE — ED ADULT NURSE NOTE - NSFALLUNIVINTERV_ED_ALL_ED
Within functional limits Bed/Stretcher in lowest position, wheels locked, appropriate side rails in place/Call bell, personal items and telephone in reach/Instruct patient to call for assistance before getting out of bed/chair/stretcher/Non-slip footwear applied when patient is off stretcher/[place] to call system/Physically safe environment - no spills, clutter or unnecessary equipment/Purposeful proactive rounding/Room/bathroom lighting operational, light cord in reach

## 2023-05-22 NOTE — ED ADULT TRIAGE NOTE - BP NONINVASIVE DIASTOLIC (MM HG)
80
pt from home c/o of Rt palmar laceration cut by yaya at work today pt is alert awake oriented x3 Rt palmar laceration site moderate amt of oozing noted on gauze no active bleeding at this time

## 2023-05-22 NOTE — ED ADULT NURSE NOTE - OBJECTIVE STATEMENT
42 y.o. F A&Ox4 PMHx of fibroids w/ recent removal, anemia, presenting to ED via walk-in triage for palpitations. Pt states that her LMP was 12 days ago (05/10). States that in the past she has had palpitations related to her anemia from the fibroids. States that after her fibroid removal, her hemoglobin was 12, but she has since had a heavy period. States that yesterday night approx 7 hours prior to arrival she noted start of palpitations. States that she woke up 3 hours later with SOB. States that she has also noted some recent back pain which felt similar to the palpitations. Pt denies current fever/chills, H/A, lightheadedness/dizziness, vision changes, SOB, chest pain, abd pain, N/V/D, constipation, dysuria, hematuria, hematochezia, weakness, numbness, and tingling. Upon assessment, pt alert, grossly neurologically intact, and well appearing. Pupils PERRLA and no drainage noted. Airway patent, chest rise symmetrical with no advantageous L/S, and pt is eupneic. Skin is warm, dry, and normal for race. No cyanosis noted to lips or fingernails. Mucous membranes moist. Negative clubbed fingernails. Radial pulses equal and +2 bilaterally. Abd soft, nontender, nondistended. ROM intact and strength +5 in all four extremities. No edema noted to bilateral legs or arms. Placed on CM, NSR in the 90s. Pt resting in stretcher in position of comfort. Stretcher locked and lowered.

## 2023-05-22 NOTE — ED PROVIDER NOTE - PROGRESS NOTE DETAILS
Pt with hg 9.6 down from 12.5 2 weeks prior, but above prior presurgical baseline. Hg drop likely contributing to tachycardia. Labs non actionable at this time. Pt currently feeling better. Imaging and labs discussed with patient. Pt will follow up with her cardiologist and primary care as an out patient for further workup. Stable for dc HR 80-90 currently. Pt in agreement with plan. answered all questions and gave return precautions.

## 2023-05-22 NOTE — ED PROVIDER NOTE - PHYSICAL EXAMINATION
Gen: WDWN, NAD  HEENT: EOMI, no nasal discharge, mucous membranes moist  CV: Tachycardic, +S1/S2, no M/R/G, 2+ radial pulses b/l  Resp: CTAB, no W/R/R, no accessory muscle use, no increased work of breathing  GI: Abdomen soft non-distended, NTTP  MSK: No open wounds, no bruising, no LE edema  Neuro: A&Ox4, following commands, moving all four extremities spontaneously  Psych: appropriate mood

## 2023-05-22 NOTE — ED PROVIDER NOTE - ATTENDING CONTRIBUTION TO CARE
Patient presents with palpitations and mild shortness of breath, now resolved.  On arrival patient initially tachycardic, resolved without intervention.  Labs were done to assess for acute metabolic, infectious, hematologic abnormality.  Patient found to be anemic relative to the hemoglobin after her surgery, but not low enough to require transfusion.  As patient is currently asymptomatic, she appears to need no further hospitalization.  Patient is comfortable with plan to follow-up closely with her PCP for repeat hemoglobin and further evaluation.  Patient's clinical picture not very concerning for PE at this time given no active shortness of breath, normal O2 sat, vital signs normalizing without intervention.

## 2023-05-22 NOTE — ED PROVIDER NOTE - PATIENT PORTAL LINK FT
You can access the FollowMyHealth Patient Portal offered by [facility] by registering at the following website: [email or web address]. By joining [facility]’s FollowMyHealth portal, you will also be able to view your health information using other applications (apps) compatible with our system.

## 2023-06-10 PROBLEM — R00.2 PALPITATIONS: Status: ACTIVE | Noted: 2021-01-27

## 2023-06-10 PROBLEM — R00.0 TACHYCARDIA: Status: ACTIVE | Noted: 2021-01-27

## 2023-06-10 NOTE — HISTORY OF PRESENT ILLNESS
[FreeTextEntry1] : Shannon is returning for eval\par No syncope or blackouts\par No CP\par No LE edema

## 2023-06-10 NOTE — CARDIOLOGY SUMMARY
[de-identified] : 3/7/23\par Sinus  Tachycardia \par WITHIN NORMAL LIMITS\par  Detail Level: Zone Plan: This patient has been treated today with image guided superficial radiation therapy for non-melanoma skin cancer. Written informed consent has been previously obtained from this patient for this treatment. This consent is documented in the patient’s chart. The patient gave verbal consent to continue treatment today.\\n\\n\\nThe patient was treated with a specific radiation dose and setup as prescribed by the provider listed on this visit note. A Radiation Therapist performed administration of radiation under supervision of provider. The treatment parameters and cumulative dose are indicated above.\\n\\n\\nPrior to administering the radiation, the patient underwent a verification therapeutic radiology simulation-aided field setting defining relevant normal and abnormal target anatomy and acquiring images with high frequency ultrasound in addition to data necessary developing optimal radiation treatment process for the patient. This process includes verification of the treatment port(s) and proper treatment positioning. All treatment ports were photographed within electronic medical record. The patient’s customized lead blocking along with gross tumor volume and margin was confirmed. Considering superficial radiotherapy is clinical in setup, this requires physician and radiation therapist to clarify location interest being treated against initial images, pathology and patient anatomy. Care was taken ensuring fields treated were geometrically accurate and properly positioned using therapeutic radiology simulation-aided field setting verification per fraction. This process is also utilized to determine if any prescription or setup changes are necessary. These steps are therefore medically necessary ensuring safe and effective administration of radiation. Ongoing therapeutic radiology simulation-aided field setting verification is ordered throughout course of therapy.\\n\\n\\nA high frequency ultrasound image was acquired today for two-dimensional evaluation of the tumor volume and response to treatment, in addition to geometric accuracy of field placement. \\n\\nUS depth is 1.26mm, which is 0.23mm in difference from previous imaging. Repop +\\n\\nThe field placement and ultrasound imaging, per fraction, is separate and distinct from the initial simulation, and is an important task in providing safe administration of superficial radiation therapy. Physician evaluation of the ultrasound tumor depth will be ongoing throughout the course of treatment and is deemed medically necessary in order to ensure the efficacy of treatment and any necessary changes. Today’s image was evaluated for determination of continuation of treatment with the current plan or with necessary changes as appropriate. \\n\\n\\nAccording to provider review of verification therapeutic radiology simulation-aided field setting and imaging, energy change is required.  Decrease in energy from 70kV to 50kV due to prescription protocol. Additionally, the use of ultrasound visualization and targeted assessment allows the patient to be able to see their cancer(s) progress, encouraging patient to complete and maintain compliance through full course of radiotherapy.\\n\\nPer Dr. Reid, continued ultrasound guidance and therapeutic radiology simulation-aided field setting verification per fraction is required for field placement, measurement of tumor depth, progress and acute effect monitoring.\\n Plan: This patient has been treated today with image guided superficial radiation therapy for non-melanoma skin cancer. Written informed consent has been previously obtained from this patient for this treatment. This consent is documented in the patient’s chart. The patient gave verbal consent to continue treatment today.\\n\\n\\nThe patient was treated with a specific radiation dose and setup as prescribed by the provider listed on this visit note. A Radiation Therapist performed administration of radiation under supervision of provider. The treatment parameters and cumulative dose are indicated above.\\n\\n\\nPrior to administering the radiation, the patient underwent a verification therapeutic radiology simulation-aided field setting defining relevant normal and abnormal target anatomy and acquiring images with high frequency ultrasound in addition to data necessary developing optimal radiation treatment process for the patient. This process includes verification of the treatment port(s) and proper treatment positioning. All treatment ports were photographed within electronic medical record. The patient’s customized lead blocking along with gross tumor volume and margin was confirmed. Considering superficial radiotherapy is clinical in setup, this requires physician and radiation therapist to clarify location interest being treated against initial images, pathology and patient anatomy. Care was taken ensuring fields treated were geometrically accurate and properly positioned using therapeutic radiology simulation-aided field setting verification per fraction. This process is also utilized to determine if any prescription or setup changes are necessary. These steps are therefore medically necessary ensuring safe and effective administration of radiation. Ongoing therapeutic radiology simulation-aided field setting verification is ordered throughout course of therapy.\\n\\nNo US due to location \\n\\nThe field placement and ultrasound imaging, per fraction, is separate and distinct from the initial simulation, and is an important task in providing safe administration of superficial radiation therapy. Physician evaluation of the ultrasound tumor depth will be ongoing throughout the course of treatment and is deemed medically necessary in order to ensure the efficacy of treatment and any necessary changes. Today’s image was evaluated for determination of continuation of treatment with the current plan or with necessary changes as appropriate. \\n\\n\\nAccording to provider review of verification therapeutic radiology simulation-aided field setting and imaging, NO change is required. Additionally, the use of ultrasound visualization and targeted assessment allows the patient to be able to see their cancer(s) progress, encouraging patient to complete and maintain compliance through full course of radiotherapy.\\n\\nPer Dr. Reid, continued ultrasound guidance and therapeutic radiology simulation-aided field setting verification per fraction is required for field placement, measurement of tumor depth, progress and acute effect monitoring. \\n\\n

## 2023-06-10 NOTE — DISCUSSION/SUMMARY
[FreeTextEntry1] : No further testing or work-up needed\par May proceed with planned fibroid surgery [EKG obtained to assist in diagnosis and management of assessed problem(s)] : EKG obtained to assist in diagnosis and management of assessed problem(s)

## 2023-07-20 NOTE — PATIENT PROFILE ADULT - DO YOU FEEL UNSAFE AT HOME, WORK, OR SCHOOL?
35 year old female with hx of PCOS s/p hysterectomy, breast lump s/p resection, brain AVM 1 cm, presenting for lower abd pain and constipation here for colonoscopy for constipation and abdominal pain
no

## 2023-09-01 NOTE — ED ADULT TRIAGE NOTE - TEMPERATURE IN CELSIUS (DEGREES C)
Call dialysis first thing in the morning    Return to the emergency room for worsening symptoms.  
36.6

## 2023-10-12 NOTE — ED PROVIDER NOTE - CLINICAL SUMMARY MEDICAL DECISION MAKING FREE TEXT BOX
Dr. Hernandez (Attending Physician)  Pt. with ho iron deficiency anemia from heavy menses pw palpitations intermittent chest/back pain between shoulder blades, nonpleuritic, not tearing or severe. Tachycardic up to 120s. Not short of breath O2 sat normal. Similar presentation in October. Had neg d-dimer and echo that was normal. Will obs for stress vs. CTCA. Likely start on beta blocker. Give IV iron for anemia. No

## 2024-02-06 NOTE — ED PROVIDER NOTE - OBJECTIVE STATEMENT
41 F w/ hx of iron deficiency anemia on IV iron infusion weekly, presents to the ER w/ palpitations, pt states that she has no cp, no sob, no nausea no vomiting, no lightheadedness, no dizziness, pt w/ no arm/leg weakness, no numbness in the arms/legs, pt w/ no abd pain, no hematuria, no dysuria, no vaginal bleeding last period wason 8/26, pt states that it usually lasts for 6 days and then self resolves. paranoia, anxiety

## 2024-05-08 NOTE — ED ADULT NURSE NOTE - NSFALLRSKPASTHIST_ED_ALL_ED
Detail Level: Detailed
Continue Regimen: Dutasteride 0.5 mg PO daily, minoxidil 2.5 mg 1/2 tablet PO BID
no

## 2024-05-13 NOTE — ED ADULT NURSE NOTE - NSFALLRSKHARMRISK_ED_ALL_ED
Medication:   Requested Prescriptions     Pending Prescriptions Disp Refills    TRULICITY 0.75 MG/0.5ML SOPN SC injection [Pharmacy Med Name: TRULICITY 0.75 MG/0.5 ML PEN]  1     Sig: INJECT 0.75 MG SUBCUTANEOUSLY ONE TIME PER WEEK     Last Filled:  8.29.23    Last appt: 5/9/2024   Next appt: 5/23/2024    Last OARRS:       8/29/2023     6:55 PM   RX Monitoring   Periodic Controlled Substance Monitoring No signs of potential drug abuse or diversion identified.     
no

## 2024-06-21 NOTE — ED PROVIDER NOTE - NSTIMEPROVIDERCAREINITIATE_GEN_ER
Transitional Care Management Telephone Call Attempt    Discharge Date: 6/17/24  Discharge Location: [facility]: [facility]    Call Attempt Date: 6/21/2024  Call Attempt: Second  
05-May-2023 17:11

## 2025-03-19 ENCOUNTER — NON-APPOINTMENT (OUTPATIENT)
Age: 45
End: 2025-03-19

## 2025-04-01 DIAGNOSIS — R10.9 UNSPECIFIED ABDOMINAL PAIN: ICD-10-CM

## 2025-04-01 DIAGNOSIS — M54.9 DORSALGIA, UNSPECIFIED: ICD-10-CM

## 2025-04-01 LAB
APPEARANCE: CLEAR
BACTERIA: NEGATIVE /HPF
BILIRUBIN URINE: NEGATIVE
BLOOD URINE: NEGATIVE
CAST: 0 /LPF
COLOR: YELLOW
EPITHELIAL CELLS: 3 /HPF
GLUCOSE QUALITATIVE U: NEGATIVE MG/DL
KETONES URINE: NEGATIVE MG/DL
LEUKOCYTE ESTERASE URINE: NEGATIVE
MICROSCOPIC-UA: NORMAL
NITRITE URINE: NEGATIVE
PH URINE: 6.5
PROTEIN URINE: NEGATIVE MG/DL
RED BLOOD CELLS URINE: 1 /HPF
SPECIFIC GRAVITY URINE: 1.02
UROBILINOGEN URINE: 0.2 MG/DL
WHITE BLOOD CELLS URINE: 0 /HPF

## 2025-05-21 NOTE — PHYSICAL EXAM
Goal <180  stable  cont meds [Well Developed] : well developed [Well Nourished] : well nourished [No Acute Distress] : no acute distress [Normal Conjunctiva] : normal conjunctiva [Normal Venous Pressure] : normal venous pressure [No Carotid Bruit] : no carotid bruit [Normal S1, S2] : normal S1, S2 [No Murmur] : no murmur [No Rub] : no rub [No Gallop] : no gallop [Clear Lung Fields] : clear lung fields [Good Air Entry] : good air entry [No Respiratory Distress] : no respiratory distress  [Soft] : abdomen soft [Non Tender] : non-tender [No Masses/organomegaly] : no masses/organomegaly [Normal Bowel Sounds] : normal bowel sounds [Normal Gait] : normal gait [No Edema] : no edema -TTE as above  -Management per cards. [No Cyanosis] : no cyanosis [No Clubbing] : no clubbing [No Varicosities] : no varicosities [No Rash] : no rash [No Skin Lesions] : no skin lesions [Moves all extremities] : moves all extremities [No Focal Deficits] : no focal deficits [Normal Speech] : normal speech [Alert and Oriented] : alert and oriented [Normal memory] : normal memory

## 2025-09-17 ENCOUNTER — RESULT REVIEW (OUTPATIENT)
Age: 45
End: 2025-09-17

## 2025-09-17 DIAGNOSIS — N92.6 IRREGULAR MENSTRUATION, UNSPECIFIED: ICD-10-CM

## 2025-09-26 PROBLEM — Z01.419 PAPANICOLAOU SMEAR, AS PART OF ROUTINE GYNECOLOGICAL EXAMINATION: Status: ACTIVE | Noted: 2025-09-26

## 2025-09-26 PROBLEM — Z11.51 ENCOUNTER FOR SCREENING FOR HUMAN PAPILLOMAVIRUS (HPV): Status: ACTIVE | Noted: 2025-09-26

## 2025-09-26 PROBLEM — R92.30 DENSE BREASTS: Status: ACTIVE | Noted: 2025-09-26

## 2025-09-26 PROBLEM — Z12.39 SCREENING BREAST EXAMINATION: Status: ACTIVE | Noted: 2025-09-26

## (undated) DEVICE — PACK LITHO ABDOMINAL TIBURON

## (undated) DEVICE — VISITEC 4X4

## (undated) DEVICE — DRAPE INSTRUMENT POUCH 6.75" X 11"

## (undated) DEVICE — ELCTR BOVIE PENCIL SMOKE EVACUATION

## (undated) DEVICE — UTERINE MANIPULATOR CONMED VCARE LG 37MM

## (undated) DEVICE — SUT POLYSORB 2-0 30" V-20 UNDYED

## (undated) DEVICE — SUT POLYSORB 0 30" GS-21 UNDYED

## (undated) DEVICE — MARKING PEN W RULER

## (undated) DEVICE — SYR LUER LOK 10CC

## (undated) DEVICE — SOL IRR POUR NS 0.9% 500ML

## (undated) DEVICE — SUT POLYSORB 4-0 30" SC-2 UNDYED

## (undated) DEVICE — Device

## (undated) DEVICE — SUT POLYSORB 3-0 27" GS-21 UNDYED

## (undated) DEVICE — GOWN TRIMAX LG

## (undated) DEVICE — SUT POLYSORB 2-0 30" GS-21 UNDYED

## (undated) DEVICE — STAPLER SKIN VISI-STAT 35 WIDE

## (undated) DEVICE — POSITIONER FOAM EGG CRATE ULNAR 2PCS (PINK)

## (undated) DEVICE — BLADE SCALPEL SAFETYLOCK #15

## (undated) DEVICE — PACK MAJOR ABDOMINAL SUPINE

## (undated) DEVICE — DRSG OPSITE 13.75 X 4"

## (undated) DEVICE — DRSG STERISTRIPS 0.5 X 4"

## (undated) DEVICE — SUT VLOC 180 0 9" GS-21 GREEN

## (undated) DEVICE — UTERINE MANIPULATOR CLINICAL INNOVATIONS CLEARVIEW 7CM

## (undated) DEVICE — PREP CHLORAPREP HI-LITE ORANGE 26ML

## (undated) DEVICE — UTERINE MANIPULATOR CONMED VCARE MED 34MM

## (undated) DEVICE — PREP BETADINE KIT

## (undated) DEVICE — SEALER TISSUE ENSEAL CURVED X1 LG 13MM

## (undated) DEVICE — FOLEY TRAY 16FR LF URINE METER SURESTEP

## (undated) DEVICE — SUT VLOC 180 0 12" GS-21 GREEN

## (undated) DEVICE — GLV 8.5 PROTEXIS (WHITE)

## (undated) DEVICE — GLV 7.5 PROTEXIS (WHITE)

## (undated) DEVICE — WARMING BLANKET UPPER ADULT

## (undated) DEVICE — SUT VLOC 180 0 18" GS-21 GREEN

## (undated) DEVICE — FOLEY HOLDER STATLOCK 2 WAY ADULT

## (undated) DEVICE — GLV 8 PROTEXIS (WHITE)

## (undated) DEVICE — DRAPE TOWEL BLUE 17" X 24"

## (undated) DEVICE — DRAPE MAYO STAND 30"

## (undated) DEVICE — MEDICATION LABELS W MARKER

## (undated) DEVICE — LAP PAD 18 X 18"

## (undated) DEVICE — UTERINE MANIPULATOR CONMED VCARE SM 32MM

## (undated) DEVICE — SUT POLYSORB 0 36" GS-24 UNDYED

## (undated) DEVICE — VENODYNE/SCD SLEEVE CALF LARGE

## (undated) DEVICE — PREP BETADINE SPONGE STICKS

## (undated) DEVICE — GLV 6.5 PROTEXIS (WHITE)

## (undated) DEVICE — BLADE SCALPEL SAFETYLOCK #10

## (undated) DEVICE — SYR LUER LOK 20CC

## (undated) DEVICE — PREP CHLOROHEXIDINE 4% 118CC KIT

## (undated) DEVICE — ABDOMINAL BINDER XL 9" X 62"-74"

## (undated) DEVICE — DRAPE LIGHT HANDLE COVER (BLUE)

## (undated) DEVICE — NDL HYPO REGULAR BEVEL 22G X 1.5" (TURQUOISE)

## (undated) DEVICE — UTERINE MANIPULATOR COOPER SURGICAL 5MM 33CM GREEN

## (undated) DEVICE — SOL IRR POUR H2O 250ML

## (undated) DEVICE — GLV 7 PROTEXIS (WHITE)

## (undated) DEVICE — SPECIMEN CONTAINER 100ML